# Patient Record
Sex: FEMALE | Race: OTHER | Employment: FULL TIME | URBAN - METROPOLITAN AREA
[De-identification: names, ages, dates, MRNs, and addresses within clinical notes are randomized per-mention and may not be internally consistent; named-entity substitution may affect disease eponyms.]

---

## 2018-10-13 ENCOUNTER — OFFICE VISIT (OUTPATIENT)
Dept: FAMILY MEDICINE CLINIC | Facility: CLINIC | Age: 38
End: 2018-10-13
Payer: COMMERCIAL

## 2018-10-13 VITALS
SYSTOLIC BLOOD PRESSURE: 120 MMHG | DIASTOLIC BLOOD PRESSURE: 78 MMHG | WEIGHT: 212 LBS | HEART RATE: 70 BPM | OXYGEN SATURATION: 98 % | TEMPERATURE: 98 F | RESPIRATION RATE: 16 BRPM

## 2018-10-13 DIAGNOSIS — J02.9 SORE THROAT: Primary | ICD-10-CM

## 2018-10-13 PROCEDURE — 99202 OFFICE O/P NEW SF 15 MIN: CPT | Performed by: PHYSICIAN ASSISTANT

## 2018-10-13 PROCEDURE — 87880 STREP A ASSAY W/OPTIC: CPT | Performed by: PHYSICIAN ASSISTANT

## 2018-10-13 NOTE — PROGRESS NOTES
CHIEF COMPLAINT:   Patient presents with:  Sore Throat: 3 days, felt feverish this am, no fever reducers      HPI:   Monika Finley is a 41year old female who presents with sore throat. Symptoms have been persistent since onset.  Patient reports clear patricia murmur  EXTREMITIES: no cyanosis, clubbing or edema  LYMPH:  no cervical, submandibular, periauricular, or supraclavicular lymphadenopathy.       Recent Results (from the past 24 hour(s))   STREP A ASSAY W/OPTIC    Collection Time: 10/13/18 11:30 AM   Resul

## 2018-10-13 NOTE — PATIENT INSTRUCTIONS
When You Have a Sore Throat    A sore throat can be painful. There are many reasons why you may have a sore throat. Your healthcare provider will work with you to find the cause of your sore throat. He or she will also find the best treatment for you.   Sirisha Beal During the exam, your healthcare provider checks your ears, nose, and throat for problems.  He or she also checks for swelling in the neck, and may listen to your chest.  Possible tests  Other tests your healthcare provider may perform include:  · A throat If your sore throat is due to a bacterial infection, antibiotics may speed healing and prevent complications.  Although group A streptococcus (\"strep throat\" or GAS) is the major treatable infection for a sore throat, GAS causes only 5% to 15% of sore thr © 3699-8169 [employer] [address] [facility], [facility] [place]. All rights reserved. This information is not intended as a substitute for professional medical care. Always follow your healthcare professional's instructions.

## 2018-11-23 ENCOUNTER — OFFICE VISIT (OUTPATIENT)
Dept: FAMILY MEDICINE CLINIC | Facility: CLINIC | Age: 38
End: 2018-11-23
Payer: COMMERCIAL

## 2018-11-23 VITALS
HEIGHT: 67 IN | SYSTOLIC BLOOD PRESSURE: 114 MMHG | HEART RATE: 72 BPM | WEIGHT: 200 LBS | TEMPERATURE: 98 F | RESPIRATION RATE: 15 BRPM | DIASTOLIC BLOOD PRESSURE: 66 MMHG | BODY MASS INDEX: 31.39 KG/M2

## 2018-11-23 DIAGNOSIS — R30.0 DYSURIA: ICD-10-CM

## 2018-11-23 DIAGNOSIS — N30.01 ACUTE CYSTITIS WITH HEMATURIA: Primary | ICD-10-CM

## 2018-11-23 PROCEDURE — 99213 OFFICE O/P EST LOW 20 MIN: CPT | Performed by: PHYSICIAN ASSISTANT

## 2018-11-23 PROCEDURE — 81003 URINALYSIS AUTO W/O SCOPE: CPT | Performed by: PHYSICIAN ASSISTANT

## 2018-11-23 RX ORDER — NITROFURANTOIN 25; 75 MG/1; MG/1
100 CAPSULE ORAL 2 TIMES DAILY
Qty: 14 CAPSULE | Refills: 0 | Status: SHIPPED | OUTPATIENT
Start: 2018-11-23 | End: 2018-11-30

## 2018-11-23 NOTE — PROGRESS NOTES
CHIEF COMPLAINT:   Patient presents with:  Urinary: sx started yesterday, worsening significantly throughout the day, freq, urg, burning, pain      HPI:   Coni Whitley is a 41year old female who presents with symptoms of dysuria, frequency, suprapubi SPECIFIC GRAVITY 1.015 1.005 - 1.030    OCCULT BLOOD large (A) Negative    PH, URINE 6 4.5 - 8.0    PROTEIN (URINE DIPSTICK) 100 Negative/Trace mg/dL    UROBILINOGEN,SEMI-QN 0.2 0.0 - 1.9 mg/dL    NITRITE, URINE neg Negative    LEUKOCYTES moderate (A) Neg The most common place for an infection is in the bladder. This is called a bladder infection. This is one of the most common infections in women. Most bladder infections are easily treated. They are not serious unless the infection spreads to the kidney. Bladder infections are diagnosed by a urine test. They are treated with antibiotics and usually clear up quickly without complications. Treatment helps prevent a more serious kidney infection.   Medicines  Medicines can help in the treatment of a bladder in Call your healthcare provider if all symptoms are not gone after 3 days of treatment. This is especially important if you have repeat infections. If a culture was done, you will be told if your treatment needs to be changed.  If directed, you can call to f

## 2019-10-31 ENCOUNTER — APPOINTMENT (OUTPATIENT)
Dept: ULTRASOUND IMAGING | Facility: HOSPITAL | Age: 39
End: 2019-10-31
Attending: EMERGENCY MEDICINE
Payer: COMMERCIAL

## 2019-10-31 ENCOUNTER — HOSPITAL ENCOUNTER (EMERGENCY)
Facility: HOSPITAL | Age: 39
Discharge: HOME | End: 2019-10-31
Attending: EMERGENCY MEDICINE
Payer: COMMERCIAL

## 2019-10-31 VITALS
RESPIRATION RATE: 18 BRPM | DIASTOLIC BLOOD PRESSURE: 61 MMHG | BODY MASS INDEX: 32 KG/M2 | WEIGHT: 204 LBS | SYSTOLIC BLOOD PRESSURE: 111 MMHG | OXYGEN SATURATION: 100 % | TEMPERATURE: 97 F | HEART RATE: 88 BPM

## 2019-10-31 DIAGNOSIS — K80.20 CALCULUS OF GALLBLADDER WITHOUT CHOLECYSTITIS WITHOUT OBSTRUCTION: Primary | ICD-10-CM

## 2019-10-31 PROCEDURE — 85025 COMPLETE CBC W/AUTO DIFF WBC: CPT

## 2019-10-31 PROCEDURE — 80048 BASIC METABOLIC PNL TOTAL CA: CPT

## 2019-10-31 PROCEDURE — 96374 THER/PROPH/DIAG INJ IV PUSH: CPT

## 2019-10-31 PROCEDURE — 80048 BASIC METABOLIC PNL TOTAL CA: CPT | Performed by: EMERGENCY MEDICINE

## 2019-10-31 PROCEDURE — 99284 EMERGENCY DEPT VISIT MOD MDM: CPT

## 2019-10-31 PROCEDURE — 80076 HEPATIC FUNCTION PANEL: CPT | Performed by: EMERGENCY MEDICINE

## 2019-10-31 PROCEDURE — 96361 HYDRATE IV INFUSION ADD-ON: CPT

## 2019-10-31 PROCEDURE — 76705 ECHO EXAM OF ABDOMEN: CPT | Performed by: EMERGENCY MEDICINE

## 2019-10-31 PROCEDURE — 83690 ASSAY OF LIPASE: CPT | Performed by: EMERGENCY MEDICINE

## 2019-10-31 PROCEDURE — C9113 INJ PANTOPRAZOLE SODIUM, VIA: HCPCS | Performed by: EMERGENCY MEDICINE

## 2019-10-31 PROCEDURE — 85025 COMPLETE CBC W/AUTO DIFF WBC: CPT | Performed by: EMERGENCY MEDICINE

## 2019-10-31 RX ORDER — POTASSIUM CHLORIDE 20 MEQ/1
40 TABLET, EXTENDED RELEASE ORAL ONCE
Status: COMPLETED | OUTPATIENT
Start: 2019-10-31 | End: 2019-10-31

## 2019-10-31 RX ORDER — CEFUROXIME AXETIL 250 MG/1
250 TABLET ORAL 2 TIMES DAILY
COMMUNITY
End: 2019-12-14

## 2019-10-31 RX ORDER — PANTOPRAZOLE SODIUM 40 MG/1
40 TABLET, DELAYED RELEASE ORAL DAILY
Qty: 30 TABLET | Refills: 0 | Status: SHIPPED | OUTPATIENT
Start: 2019-10-31 | End: 2019-11-30

## 2019-10-31 RX ORDER — ONDANSETRON 4 MG/1
4 TABLET, ORALLY DISINTEGRATING ORAL EVERY 8 HOURS PRN
Qty: 15 TABLET | Refills: 0 | Status: SHIPPED | OUTPATIENT
Start: 2019-10-31 | End: 2019-11-05

## 2019-11-01 NOTE — ED INITIAL ASSESSMENT (HPI)
Upper abdominal pain starting tonight, denies n/v/d. Pt states she's currently on an antibiotic for UTI.

## 2019-11-01 NOTE — ED PROVIDER NOTES
Patient Seen in: [facility] AND [facility] Emergency Department    History   Patient presents with:  Abdomen/Flank Pain (GI/)    Stated Complaint: abdominal pain tonight     HPI    51-year-old female without past medical history presenting for evaluation of uppe (Oral)   Resp 18   Wt 92.5 kg   LMP 10/12/2019   SpO2 100%   BMI 31.95 kg/m²         Physical Exam   Constitutional: No distress. Obese, nontoxic. HEENT: MMM. Head: Normocephalic. Eyes: No injection. Cardiovascular: RRR.    Pulmonary/Chest: Effort nor epigastric pain and nausea  Other Notes (entered by Technologist): gb w/ large stone. wall thickness and cbd wnl. neg rust sign.  liver and panc appear wnl    Additional Information (per Vision Radiologist):      US RUQ      IMPRESSION:  Large 3 cm stone surgery followup.     Disposition and Plan     Clinical Impression:  Calculus of gallbladder without cholecystitis without obstruction  (primary encounter diagnosis)    Disposition:  Discharge    Follow-up:  MD Emily Butler

## 2019-11-07 ENCOUNTER — OFFICE VISIT (OUTPATIENT)
Dept: SURGERY | Facility: CLINIC | Age: 39
End: 2019-11-07
Payer: COMMERCIAL

## 2019-11-07 VITALS — WEIGHT: 204 LBS | BODY MASS INDEX: 32.02 KG/M2 | HEIGHT: 67 IN

## 2019-11-07 DIAGNOSIS — K80.20 CALCULUS OF GALLBLADDER WITHOUT CHOLECYSTITIS WITHOUT OBSTRUCTION: Primary | ICD-10-CM

## 2019-11-07 PROCEDURE — 99204 OFFICE O/P NEW MOD 45 MIN: CPT | Performed by: SURGERY

## 2019-11-07 RX ORDER — KETOCONAZOLE 20 MG/ML
SHAMPOO TOPICAL
Refills: 2 | COMMUNITY
Start: 2019-09-01

## 2019-11-08 PROBLEM — K80.20 CALCULUS OF GALLBLADDER WITHOUT CHOLECYSTITIS WITHOUT OBSTRUCTION: Status: ACTIVE | Noted: 2019-11-08

## 2019-11-08 NOTE — H&P
HPI:    Patient ID: Fidelina Reagan is a 42year old female presenting with Patient presents with:  Gallbladder: F/U ER 10/31/2019 for severe abdominal pain & bloating. Pt. states she occassionally has boughts of diarrhea. Kaleigh Chris HPI    History reviewed.  Florence Rubio Concern: Not Asked        Weight Concern: Not Asked    Social History Narrative      Not on file        Constitutional: Negative. HENT: Negative. Eyes: Negative. Respiratory: Negative. Cardiovascular: Negative.     Gastrointestinal: Positive for obstruction  -     Count includes the [facility] CASE REQUEST SURGICAL;  Future      Plan for an elective laparoscopic cholecystectomy on 12/18/19 per pt request.       Ye Clayton MD  11/7/2019

## 2019-12-14 RX ORDER — PANTOPRAZOLE SODIUM 40 MG/1
40 TABLET, DELAYED RELEASE ORAL
COMMUNITY

## 2019-12-16 ENCOUNTER — TELEPHONE (OUTPATIENT)
Dept: SURGERY | Facility: CLINIC | Age: 39
End: 2019-12-16

## 2019-12-16 NOTE — TELEPHONE ENCOUNTER
Patient had questions regarding soap to use the night before surgery, advised her to use any soap that is not heavily perfumed, such as [place] or Dial.  Patient agreed.  Also advised patient the hospital will call the 17th to give final instructions prior to

## 2019-12-18 ENCOUNTER — ANESTHESIA EVENT (OUTPATIENT)
Dept: SURGERY | Facility: HOSPITAL | Age: 39
End: 2019-12-18
Payer: COMMERCIAL

## 2019-12-18 ENCOUNTER — ANESTHESIA (OUTPATIENT)
Dept: SURGERY | Facility: HOSPITAL | Age: 39
End: 2019-12-18
Payer: COMMERCIAL

## 2019-12-18 ENCOUNTER — HOSPITAL ENCOUNTER (OUTPATIENT)
Facility: HOSPITAL | Age: 39
Setting detail: HOSPITAL OUTPATIENT SURGERY
Discharge: HOME | End: 2019-12-18
Attending: SURGERY | Admitting: SURGERY
Payer: COMMERCIAL

## 2019-12-18 VITALS
OXYGEN SATURATION: 94 % | RESPIRATION RATE: 16 BRPM | DIASTOLIC BLOOD PRESSURE: 71 MMHG | HEART RATE: 97 BPM | HEIGHT: 67 IN | SYSTOLIC BLOOD PRESSURE: 134 MMHG | BODY MASS INDEX: 31.23 KG/M2 | WEIGHT: 199 LBS | TEMPERATURE: 98 F

## 2019-12-18 DIAGNOSIS — K80.20 CALCULUS OF GALLBLADDER WITHOUT CHOLECYSTITIS WITHOUT OBSTRUCTION: ICD-10-CM

## 2019-12-18 DIAGNOSIS — K80.20 BILIARY CALCULUS: Primary | ICD-10-CM

## 2019-12-18 DIAGNOSIS — K80.20 GALL STONES: ICD-10-CM

## 2019-12-18 PROCEDURE — 47562 LAPAROSCOPIC CHOLECYSTECTOMY: CPT | Performed by: SURGERY

## 2019-12-18 PROCEDURE — 0FT44ZZ RESECTION OF GALLBLADDER, PERCUTANEOUS ENDOSCOPIC APPROACH: ICD-10-PCS | Performed by: SURGERY

## 2019-12-18 RX ORDER — METOCLOPRAMIDE 10 MG/1
10 TABLET ORAL ONCE
Status: COMPLETED | OUTPATIENT
Start: 2019-12-18 | End: 2019-12-18

## 2019-12-18 RX ORDER — HYDROCODONE BITARTRATE AND ACETAMINOPHEN 5; 325 MG/1; MG/1
2 TABLET ORAL AS NEEDED
Status: DISCONTINUED | OUTPATIENT
Start: 2019-12-18 | End: 2019-12-18

## 2019-12-18 RX ORDER — ONDANSETRON 2 MG/ML
INJECTION INTRAMUSCULAR; INTRAVENOUS AS NEEDED
Status: DISCONTINUED | OUTPATIENT
Start: 2019-12-18 | End: 2019-12-18 | Stop reason: SURG

## 2019-12-18 RX ORDER — HYDROCODONE BITARTRATE AND ACETAMINOPHEN 5; 325 MG/1; MG/1
1 TABLET ORAL AS NEEDED
Status: DISCONTINUED | OUTPATIENT
Start: 2019-12-18 | End: 2019-12-18

## 2019-12-18 RX ORDER — MORPHINE SULFATE 4 MG/ML
4 INJECTION, SOLUTION INTRAMUSCULAR; INTRAVENOUS EVERY 10 MIN PRN
Status: DISCONTINUED | OUTPATIENT
Start: 2019-12-18 | End: 2019-12-18

## 2019-12-18 RX ORDER — SODIUM CHLORIDE, SODIUM LACTATE, POTASSIUM CHLORIDE, CALCIUM CHLORIDE 600; 310; 30; 20 MG/100ML; MG/100ML; MG/100ML; MG/100ML
INJECTION, SOLUTION INTRAVENOUS CONTINUOUS
Status: DISCONTINUED | OUTPATIENT
Start: 2019-12-18 | End: 2019-12-18

## 2019-12-18 RX ORDER — ROCURONIUM BROMIDE 10 MG/ML
INJECTION, SOLUTION INTRAVENOUS AS NEEDED
Status: DISCONTINUED | OUTPATIENT
Start: 2019-12-18 | End: 2019-12-18 | Stop reason: SURG

## 2019-12-18 RX ORDER — CEFAZOLIN SODIUM/WATER 2 G/20 ML
2 SYRINGE (ML) INTRAVENOUS ONCE
Status: COMPLETED | OUTPATIENT
Start: 2019-12-18 | End: 2019-12-18

## 2019-12-18 RX ORDER — HALOPERIDOL 5 MG/ML
0.25 INJECTION INTRAMUSCULAR ONCE AS NEEDED
Status: DISCONTINUED | OUTPATIENT
Start: 2019-12-18 | End: 2019-12-18

## 2019-12-18 RX ORDER — NEOSTIGMINE METHYLSULFATE 0.5 MG/ML
INJECTION INTRAVENOUS AS NEEDED
Status: DISCONTINUED | OUTPATIENT
Start: 2019-12-18 | End: 2019-12-18 | Stop reason: SURG

## 2019-12-18 RX ORDER — PROCHLORPERAZINE EDISYLATE 5 MG/ML
5 INJECTION INTRAMUSCULAR; INTRAVENOUS ONCE AS NEEDED
Status: DISCONTINUED | OUTPATIENT
Start: 2019-12-18 | End: 2019-12-18

## 2019-12-18 RX ORDER — GLYCOPYRROLATE 0.2 MG/ML
INJECTION INTRAMUSCULAR; INTRAVENOUS AS NEEDED
Status: DISCONTINUED | OUTPATIENT
Start: 2019-12-18 | End: 2019-12-18 | Stop reason: SURG

## 2019-12-18 RX ORDER — ONDANSETRON 2 MG/ML
4 INJECTION INTRAMUSCULAR; INTRAVENOUS ONCE AS NEEDED
Status: COMPLETED | OUTPATIENT
Start: 2019-12-18 | End: 2019-12-18

## 2019-12-18 RX ORDER — HYDROMORPHONE HYDROCHLORIDE 1 MG/ML
0.4 INJECTION, SOLUTION INTRAMUSCULAR; INTRAVENOUS; SUBCUTANEOUS EVERY 5 MIN PRN
Status: DISCONTINUED | OUTPATIENT
Start: 2019-12-18 | End: 2019-12-18

## 2019-12-18 RX ORDER — HYDROMORPHONE HYDROCHLORIDE 1 MG/ML
0.2 INJECTION, SOLUTION INTRAMUSCULAR; INTRAVENOUS; SUBCUTANEOUS EVERY 5 MIN PRN
Status: DISCONTINUED | OUTPATIENT
Start: 2019-12-18 | End: 2019-12-18

## 2019-12-18 RX ORDER — MORPHINE SULFATE 4 MG/ML
2 INJECTION, SOLUTION INTRAMUSCULAR; INTRAVENOUS EVERY 10 MIN PRN
Status: DISCONTINUED | OUTPATIENT
Start: 2019-12-18 | End: 2019-12-18

## 2019-12-18 RX ORDER — NALOXONE HYDROCHLORIDE 0.4 MG/ML
80 INJECTION, SOLUTION INTRAMUSCULAR; INTRAVENOUS; SUBCUTANEOUS AS NEEDED
Status: DISCONTINUED | OUTPATIENT
Start: 2019-12-18 | End: 2019-12-18

## 2019-12-18 RX ORDER — MORPHINE SULFATE 10 MG/ML
6 INJECTION, SOLUTION INTRAMUSCULAR; INTRAVENOUS EVERY 10 MIN PRN
Status: DISCONTINUED | OUTPATIENT
Start: 2019-12-18 | End: 2019-12-18

## 2019-12-18 RX ORDER — BUPIVACAINE HYDROCHLORIDE 5 MG/ML
INJECTION, SOLUTION EPIDURAL; INTRACAUDAL AS NEEDED
Status: DISCONTINUED | OUTPATIENT
Start: 2019-12-18 | End: 2019-12-18 | Stop reason: HOSPADM

## 2019-12-18 RX ORDER — HYDROMORPHONE HYDROCHLORIDE 1 MG/ML
0.6 INJECTION, SOLUTION INTRAMUSCULAR; INTRAVENOUS; SUBCUTANEOUS EVERY 5 MIN PRN
Status: DISCONTINUED | OUTPATIENT
Start: 2019-12-18 | End: 2019-12-18

## 2019-12-18 RX ORDER — LIDOCAINE HYDROCHLORIDE 10 MG/ML
INJECTION, SOLUTION EPIDURAL; INFILTRATION; INTRACAUDAL; PERINEURAL AS NEEDED
Status: DISCONTINUED | OUTPATIENT
Start: 2019-12-18 | End: 2019-12-18 | Stop reason: SURG

## 2019-12-18 RX ORDER — HYDROCODONE BITARTRATE AND ACETAMINOPHEN 5; 325 MG/1; MG/1
1 TABLET ORAL EVERY 4 HOURS PRN
Qty: 30 TABLET | Refills: 0 | Status: SHIPPED | OUTPATIENT
Start: 2019-12-18 | End: 2020-01-03 | Stop reason: ALTCHOICE

## 2019-12-18 RX ORDER — KETOROLAC TROMETHAMINE 30 MG/ML
INJECTION, SOLUTION INTRAMUSCULAR; INTRAVENOUS AS NEEDED
Status: DISCONTINUED | OUTPATIENT
Start: 2019-12-18 | End: 2019-12-18 | Stop reason: SURG

## 2019-12-18 RX ORDER — DEXAMETHASONE SODIUM PHOSPHATE 4 MG/ML
VIAL (ML) INJECTION AS NEEDED
Status: DISCONTINUED | OUTPATIENT
Start: 2019-12-18 | End: 2019-12-18 | Stop reason: SURG

## 2019-12-18 RX ORDER — ONDANSETRON 4 MG/1
4 TABLET, FILM COATED ORAL EVERY 8 HOURS PRN
Qty: 15 TABLET | Refills: 0 | Status: SHIPPED | OUTPATIENT
Start: 2019-12-18 | End: 2020-01-03 | Stop reason: ALTCHOICE

## 2019-12-18 RX ORDER — ACETAMINOPHEN 500 MG
1000 TABLET ORAL ONCE
Status: COMPLETED | OUTPATIENT
Start: 2019-12-18 | End: 2019-12-18

## 2019-12-18 RX ORDER — MIDAZOLAM HYDROCHLORIDE 1 MG/ML
INJECTION INTRAMUSCULAR; INTRAVENOUS AS NEEDED
Status: DISCONTINUED | OUTPATIENT
Start: 2019-12-18 | End: 2019-12-18 | Stop reason: SURG

## 2019-12-18 RX ORDER — POLYETHYLENE GLYCOL 3350 17 G/17G
17 POWDER, FOR SOLUTION ORAL DAILY
Qty: 14 PACKET | Refills: 0 | Status: SHIPPED | OUTPATIENT
Start: 2019-12-18 | End: 2020-01-01

## 2019-12-18 RX ORDER — RUFINAMIDE 40 MG/ML
1 SUSPENSION ORAL DAILY
COMMUNITY

## 2019-12-18 RX ORDER — FAMOTIDINE 20 MG/1
20 TABLET ORAL ONCE
Status: COMPLETED | OUTPATIENT
Start: 2019-12-18 | End: 2019-12-18

## 2019-12-18 RX ADMIN — SODIUM CHLORIDE, SODIUM LACTATE, POTASSIUM CHLORIDE, CALCIUM CHLORIDE: 600; 310; 30; 20 INJECTION, SOLUTION INTRAVENOUS at 12:31:00

## 2019-12-18 RX ADMIN — LIDOCAINE HYDROCHLORIDE 50 MG: 10 INJECTION, SOLUTION EPIDURAL; INFILTRATION; INTRACAUDAL; PERINEURAL at 11:42:00

## 2019-12-18 RX ADMIN — KETOROLAC TROMETHAMINE 30 MG: 30 INJECTION, SOLUTION INTRAMUSCULAR; INTRAVENOUS at 12:23:00

## 2019-12-18 RX ADMIN — GLYCOPYRROLATE 0.6 MG: 0.2 INJECTION INTRAMUSCULAR; INTRAVENOUS at 12:30:00

## 2019-12-18 RX ADMIN — CEFAZOLIN SODIUM/WATER 2 G: 2 G/20 ML SYRINGE (ML) INTRAVENOUS at 11:46:00

## 2019-12-18 RX ADMIN — ROCURONIUM BROMIDE 40 MG: 10 INJECTION, SOLUTION INTRAVENOUS at 11:43:00

## 2019-12-18 RX ADMIN — NEOSTIGMINE METHYLSULFATE 3.5 MG: 0.5 INJECTION INTRAVENOUS at 12:30:00

## 2019-12-18 RX ADMIN — ONDANSETRON 4 MG: 2 INJECTION INTRAMUSCULAR; INTRAVENOUS at 12:22:00

## 2019-12-18 RX ADMIN — DEXAMETHASONE SODIUM PHOSPHATE 8 MG: 4 MG/ML VIAL (ML) INJECTION at 11:46:00

## 2019-12-18 RX ADMIN — MIDAZOLAM HYDROCHLORIDE 2 MG: 1 INJECTION INTRAMUSCULAR; INTRAVENOUS at 11:37:00

## 2019-12-18 NOTE — OPERATIVE REPORT
Operative Report    Patient Name:  Monika Finley  MR:  [id number]  :  1980  DOS:  19    Preop Dx:  Timmy Makua stones [K80.20]  Postop Dx:  Timmy Makua stones [K80.20], Chronic cholecystitis  Procedure:  Laparoscopic cholecystectomy  Surgeon:  Finesse Cuevas, Two structures, identified as the cystic duct and artery, are seen entering the infundibulum, thus obtaining the so called \"critical view of safety\". The cystic duct and artery were clipped and divided.   The gallbladder was detached from the liver using

## 2019-12-18 NOTE — ANESTHESIA POSTPROCEDURE EVALUATION
Patient: Aakash Watson    Procedure Summary     Date:  12/18/19 Room / Location:  [facility] OR 02 / [facility] OR    Anesthesia Start:  1187 Anesthesia Stop:  0936    Procedure:  LAPAROSCOPIC CHOLECYSTECTOMY (N/A ) Diagnosis:       Gall stones      (Jose fernández

## 2019-12-18 NOTE — H&P
Patient ID: Sloane Beebe is a 42year old female presenting with Patient presents with:  Gallbladder: F/U ER 10/31/2019 for severe abdominal pain & bloating. Pt. states she occassionally has boughts of diarrhea. .     HPI     History reviewed.  No per Stress Concern: Not Asked        Weight Concern: Not Asked    Social History Narrative      Not on file            Constitutional: Negative. HENT: Negative. Eyes: Negative. Respiratory: Negative. Cardiovascular: Negative.     Gastrointestinal: P cholecystitis without obstruction  -     [facility] CASE REQUEST SURGICAL;  Future        Plan for an elective laparoscopic cholecystectomy on 12/18/19 per pt request.  Kait Watters MD

## 2019-12-18 NOTE — ANESTHESIA PROCEDURE NOTES
Airway  Date/Time: 12/18/2019 11:44 AM  Urgency: Elective    Airway not difficult    General Information and Staff    Patient location during procedure: OR  Anesthesiologist: Simuel Nyhan, MD  Resident/CRNA: Josiah Wilson CRNA  Performed: CRNA     Indic

## 2019-12-18 NOTE — ANESTHESIA PREPROCEDURE EVALUATION
Anesthesia PreOp Note    HPI:     Sascha Guerrero is a 42year old female who presents for preoperative consultation requested by: Jake Madrigal MD    Date of Surgery: 12/18/2019    Procedure(s):  LAPAROSCOPIC CHOLECYSTECTOMY  Indication: Gall stones [K80.20] Inability: Not on file      Transportation needs:        Medical: Not on file        Non-medical: Not on file    Tobacco Use      Smoking status: Never Smoker      Smokeless tobacco: Never Used    Substance and Sexual Activity      Alcohol use:  Yes 1009 12/18/19  0953   TempSrc:  Oral   Weight: 92.1 kg (203 lb) 90.3 kg (199 lb)   Height: 1.702 m (5' 7\") 1.702 m (5' 7\")        Anesthesia Evaluation     Patient summary reviewed and Nursing notes reviewed    No history of anesthetic complications

## 2020-01-03 ENCOUNTER — OFFICE VISIT (OUTPATIENT)
Dept: SURGERY | Facility: CLINIC | Age: 40
End: 2020-01-03
Payer: COMMERCIAL

## 2020-01-03 VITALS — WEIGHT: 199 LBS | BODY MASS INDEX: 31 KG/M2

## 2020-01-03 DIAGNOSIS — Z09 POSTOPERATIVE EXAMINATION: Primary | ICD-10-CM

## 2020-01-03 PROCEDURE — 99024 POSTOP FOLLOW-UP VISIT: CPT | Performed by: SURGERY

## 2020-01-05 NOTE — PROGRESS NOTES
Patient presents with:  Post-Op: S/P Laparoscopit Cholecystectomy 11/18/2020. Patient states she feels well, has no bowel problems, appetite is good and denies fevers and pain.         O:  Wt 199 lb (90.3 kg)   LMP 12/09/2019   BMI 31.17 kg/m²   GEN:  No a

## 2022-07-11 ENCOUNTER — HOSPITAL ENCOUNTER (OUTPATIENT)
Age: 42
Discharge: HOME | End: 2022-07-11
Payer: COMMERCIAL

## 2022-07-11 VITALS
HEIGHT: 67 IN | BODY MASS INDEX: 33.74 KG/M2 | WEIGHT: 215 LBS | SYSTOLIC BLOOD PRESSURE: 152 MMHG | HEART RATE: 70 BPM | OXYGEN SATURATION: 98 % | TEMPERATURE: 97 F | RESPIRATION RATE: 16 BRPM | DIASTOLIC BLOOD PRESSURE: 64 MMHG

## 2022-07-11 DIAGNOSIS — N30.00 ACUTE CYSTITIS WITHOUT HEMATURIA: Primary | ICD-10-CM

## 2022-07-11 LAB
B-HCG UR QL: NEGATIVE
BILIRUB UR QL STRIP: NEGATIVE
GLUCOSE UR STRIP-MCNC: NEGATIVE MG/DL
KETONES UR STRIP-MCNC: NEGATIVE MG/DL
NITRITE UR QL STRIP: NEGATIVE
PH UR STRIP: 6.5 [PH]
PROT UR STRIP-MCNC: NEGATIVE MG/DL
SP GR UR STRIP: <=1.005
UROBILINOGEN UR STRIP-ACNC: <2 MG/DL

## 2022-07-11 PROCEDURE — 99203 OFFICE O/P NEW LOW 30 MIN: CPT | Performed by: PHYSICIAN ASSISTANT

## 2022-07-11 PROCEDURE — 81002 URINALYSIS NONAUTO W/O SCOPE: CPT | Performed by: PHYSICIAN ASSISTANT

## 2022-07-11 PROCEDURE — 81025 URINE PREGNANCY TEST: CPT | Performed by: PHYSICIAN ASSISTANT

## 2022-07-11 RX ORDER — CEPHALEXIN 500 MG/1
500 CAPSULE ORAL 2 TIMES DAILY
Qty: 20 CAPSULE | Refills: 0 | Status: SHIPPED | OUTPATIENT
Start: 2022-07-11 | End: 2022-07-21

## 2022-09-28 NOTE — PATIENT INSTRUCTIONS
Bladder Infection, Female (Adult)    Urine is normally doesn't have any bacteria in it. But bacteria can get into the urinary tract from the skin around the rectum. Or they can travel in the blood from elsewhere in the body.  Once they are in your urina Looks like she has been seen recently through another primary care clinic.     Simran Barreto PA-C     · Bowel incontinence  · Pregnancy  · Procedures such as having a catheter inserted  · Older age  · Not emptying your bladder. This can allow bacteria a chance to grow in your urine.   · Dehydration  · Constipation  · Sex  · Use of a diaphragm for birth cont · Avoid caffeine, alcohol, and spicy foods. These can irritate your bladder. · Urinate right after intercourse to flush out your bladder. · If you use birth control pills and have frequent bladder infections, discuss it with your doctor.   Follow-up care

## 2023-04-14 ENCOUNTER — HOSPITAL ENCOUNTER (OUTPATIENT)
Dept: ULTRASOUND IMAGING | Age: 43
Discharge: HOME | End: 2023-04-14
Attending: FAMILY MEDICINE
Payer: COMMERCIAL

## 2023-04-14 DIAGNOSIS — R13.10 DYSPHAGIA, UNSPECIFIED TYPE: ICD-10-CM

## 2023-04-14 PROCEDURE — 76536 US EXAM OF HEAD AND NECK: CPT | Performed by: FAMILY MEDICINE

## 2023-05-27 ENCOUNTER — HOSPITAL ENCOUNTER (OUTPATIENT)
Dept: MAMMOGRAPHY | Facility: HOSPITAL | Age: 43
Discharge: HOME | End: 2023-05-27
Attending: FAMILY MEDICINE
Payer: COMMERCIAL

## 2023-05-27 DIAGNOSIS — Z12.31 ENCOUNTER FOR SCREENING MAMMOGRAM FOR MALIGNANT NEOPLASM OF BREAST: ICD-10-CM

## 2023-05-27 PROCEDURE — 77063 BREAST TOMOSYNTHESIS BI: CPT | Performed by: FAMILY MEDICINE

## 2023-05-27 PROCEDURE — 77067 SCR MAMMO BI INCL CAD: CPT | Performed by: FAMILY MEDICINE

## 2023-06-13 ENCOUNTER — TELEPHONE (OUTPATIENT)
Dept: GASTROENTEROLOGY | Facility: CLINIC | Age: 43
End: 2023-06-13

## 2023-06-13 ENCOUNTER — OFFICE VISIT (OUTPATIENT)
Dept: GASTROENTEROLOGY | Facility: CLINIC | Age: 43
End: 2023-06-13

## 2023-06-13 VITALS
DIASTOLIC BLOOD PRESSURE: 70 MMHG | SYSTOLIC BLOOD PRESSURE: 115 MMHG | BODY MASS INDEX: 35.47 KG/M2 | WEIGHT: 226 LBS | HEIGHT: 67 IN | HEART RATE: 75 BPM

## 2023-06-13 DIAGNOSIS — D50.9 IRON DEFICIENCY ANEMIA, UNSPECIFIED IRON DEFICIENCY ANEMIA TYPE: Primary | ICD-10-CM

## 2023-06-13 DIAGNOSIS — R14.0 ABDOMINAL BLOATING: ICD-10-CM

## 2023-06-13 DIAGNOSIS — R12 HEARTBURN: ICD-10-CM

## 2023-06-13 DIAGNOSIS — R19.4 CHANGE IN BOWEL HABIT: ICD-10-CM

## 2023-06-13 DIAGNOSIS — R19.4 CHANGE IN BOWEL HABITS: ICD-10-CM

## 2023-06-13 DIAGNOSIS — Z80.0 FAMILY HISTORY OF COLON CANCER: ICD-10-CM

## 2023-06-13 PROCEDURE — 99204 OFFICE O/P NEW MOD 45 MIN: CPT | Performed by: INTERNAL MEDICINE

## 2023-06-13 PROCEDURE — 3078F DIAST BP <80 MM HG: CPT | Performed by: INTERNAL MEDICINE

## 2023-06-13 PROCEDURE — 3008F BODY MASS INDEX DOCD: CPT | Performed by: INTERNAL MEDICINE

## 2023-06-13 PROCEDURE — 3074F SYST BP LT 130 MM HG: CPT | Performed by: INTERNAL MEDICINE

## 2023-06-13 RX ORDER — ERGOCALCIFEROL 1.25 MG/1
50000 CAPSULE ORAL WEEKLY
COMMUNITY
Start: 2023-03-27

## 2023-06-13 RX ORDER — LORAZEPAM 0.5 MG/1
0.5 TABLET ORAL NIGHTLY PRN
COMMUNITY
Start: 2023-03-21

## 2023-06-13 RX ORDER — FERROUS SULFATE 324(65)MG
TABLET, DELAYED RELEASE (ENTERIC COATED) ORAL
COMMUNITY

## 2023-06-13 RX ORDER — OMEPRAZOLE 40 MG/1
40 CAPSULE, DELAYED RELEASE ORAL DAILY
Qty: 60 CAPSULE | Refills: 6 | Status: SHIPPED | OUTPATIENT
Start: 2023-06-13

## 2023-06-13 NOTE — PATIENT INSTRUCTIONS
PLAN    - Start metamucil powder 1-2 scoops daily to twice a day    - Start omeprazole 40 mg daily for 6 weeks   - Then, wean it to every other day for a week, then off. You can restart the medication if symptoms recur.    - Follow up after the scope tests      Instructions for the procedures  1. Schedule EGD and colonoscopy with anesthesia [Diagnosis: iron deficiency anemia, change in bowel habits, abdominal bloating]    2.  bowel prep from pharmacy (split dose golytely)    3. Continue all medications for procedure    4. Read all bowel prep instructions carefully    5. AVOID seeds, nuts, popcorn, raw fruits and vegetables (cooked is okay) for 2-3 days before procedure    6. You will need to go for COVID testing 72 hours before procedure. The testing team will call you a few days before your procedure to notify you where/when you can get COVID testing. If you do not go for COVID testing, the procedure cannot be performed. 7. If you start any NEW medication after your visit today, please notify us. Certain medications will need to be held before the procedure, or the procedure cannot be performed. Things to consider to prevent acid reflux:  Certain foods have been recommended to be removed from the diet as they may aggravate GERD:  - Fatty foods (whole fat dairy, creamy/cheesy sauces, fried/stir fried foods, etc)  - Chocolate  - Mint  - Citrus fruits/citrus fruit juices (lemon, orange, tangerine, pineapple, grapefruit)  - Spicy foods (foods made with garlic, onion, peppers, etc)  - Tomatoes and tomato products (marinara sauce, pizza, salsa, tomato juice, etc)  - Caffeinated/carbonated beverages (coffee, tea, sodas, etc)  - Alcohol  - Processed foods    There is not enough scientific evidence to support that avoiding these foods may reduce GERD. You can avoid or limit these foods to see if it helps, but there is no need to do this if they do not bother you.     Overall, avoid or limit any food that bothers you. If you can't tell, you can keep a food and symptom diary for a week to identify foods that could be a problem for you. Lifestyle Habits to Adopt That May Help Reduce Reflux  - Sit up while eating and for one or two hours afterward  - Eat smaller meals  - Avoid eating within 3 hours before bedtime  - Elevate the head of the bed while you are sleeping. You can place a phone book or a foam wedge underneath the mattress to do this.   - Lose weight if you are overweight or obese  - Exercise   - Reduce stress

## 2023-06-13 NOTE — TELEPHONE ENCOUNTER
Scheduled for: Colonoscopy [phone]  Provider Name: Dr Irving Guerrero   Date: Wed 9/20/2023   Location: [facility]  Sedation: MAC   Time: 2:15 pm   Prep: split golytely   Meds/Allergies Reconciled?: NKDA    Diagnosis with codes: iron deficiency anemia D50.9, change in bowel habits R19.4, abdominal bloating R14.0    Was patient informed to call insurance with codes (Y/N): Yes  Referral sent?: Yes    [address] [place] [address] or [facility] Th  notified?: I sent an electronic request to Endo Scheduling and received a confirmation today. Medication Orders: Pt is aware to NOT take iron pills, herbal meds and diet supplements for 7 days before exam. Also to NOT take any form of alcohol, recreational drugs and any forms of ED meds 24 hours before exam.      Misc Orders:       Further instructions given by staff:  Instructions given in office and pt verbalized understanding

## 2023-09-18 ENCOUNTER — TELEPHONE (OUTPATIENT)
Facility: CLINIC | Age: 43
End: 2023-09-18

## 2023-09-18 NOTE — TELEPHONE ENCOUNTER
Called [facility] . Left detailed message in confidential voicemail regarding below message from [address], pt's name/, procedure info disclosed    GI call back number provided should he has further questions.

## 2023-09-18 NOTE — TELEPHONE ENCOUNTER
PPD team,    URGENT REQUEST: Please see message below regarding need for PA for EGD. Pt is scheduled for egd/colonoscopy on 9/20/23. Current referral status is \" open\". Thank you.

## 2023-09-18 NOTE — TELEPHONE ENCOUNTER
Rich states 9/20/2023 EGD need prior auth. States [facility] doesn't need authorization. [phone] [phone]. Thank you.

## 2023-09-20 ENCOUNTER — HOSPITAL ENCOUNTER (OUTPATIENT)
Facility: HOSPITAL | Age: 43
Setting detail: HOSPITAL OUTPATIENT SURGERY
Discharge: HOME | End: 2023-09-20
Attending: INTERNAL MEDICINE | Admitting: INTERNAL MEDICINE
Payer: COMMERCIAL

## 2023-09-20 ENCOUNTER — ANESTHESIA EVENT (OUTPATIENT)
Dept: ENDOSCOPY | Facility: HOSPITAL | Age: 43
End: 2023-09-20
Payer: COMMERCIAL

## 2023-09-20 ENCOUNTER — ANESTHESIA (OUTPATIENT)
Dept: ENDOSCOPY | Facility: HOSPITAL | Age: 43
End: 2023-09-20
Payer: COMMERCIAL

## 2023-09-20 VITALS
OXYGEN SATURATION: 100 % | HEIGHT: 67 IN | RESPIRATION RATE: 17 BRPM | DIASTOLIC BLOOD PRESSURE: 61 MMHG | HEART RATE: 72 BPM | WEIGHT: 220 LBS | SYSTOLIC BLOOD PRESSURE: 112 MMHG | BODY MASS INDEX: 34.53 KG/M2

## 2023-09-20 DIAGNOSIS — D50.9 IRON DEFICIENCY ANEMIA, UNSPECIFIED IRON DEFICIENCY ANEMIA TYPE: ICD-10-CM

## 2023-09-20 DIAGNOSIS — R19.4 CHANGE IN BOWEL HABIT: ICD-10-CM

## 2023-09-20 DIAGNOSIS — R14.0 ABDOMINAL BLOATING: ICD-10-CM

## 2023-09-20 LAB — B-HCG UR QL: NEGATIVE

## 2023-09-20 PROCEDURE — 0DBP8ZX EXCISION OF RECTUM, VIA NATURAL OR ARTIFICIAL OPENING ENDOSCOPIC, DIAGNOSTIC: ICD-10-PCS | Performed by: INTERNAL MEDICINE

## 2023-09-20 PROCEDURE — 0DBE8ZX EXCISION OF LARGE INTESTINE, VIA NATURAL OR ARTIFICIAL OPENING ENDOSCOPIC, DIAGNOSTIC: ICD-10-PCS | Performed by: INTERNAL MEDICINE

## 2023-09-20 PROCEDURE — 0DB98ZX EXCISION OF DUODENUM, VIA NATURAL OR ARTIFICIAL OPENING ENDOSCOPIC, DIAGNOSTIC: ICD-10-PCS | Performed by: INTERNAL MEDICINE

## 2023-09-20 PROCEDURE — 45380 COLONOSCOPY AND BIOPSY: CPT | Performed by: INTERNAL MEDICINE

## 2023-09-20 PROCEDURE — 43239 EGD BIOPSY SINGLE/MULTIPLE: CPT | Performed by: INTERNAL MEDICINE

## 2023-09-20 PROCEDURE — 0DB78ZX EXCISION OF STOMACH, PYLORUS, VIA NATURAL OR ARTIFICIAL OPENING ENDOSCOPIC, DIAGNOSTIC: ICD-10-PCS | Performed by: INTERNAL MEDICINE

## 2023-09-20 PROCEDURE — 0DBB8ZX EXCISION OF ILEUM, VIA NATURAL OR ARTIFICIAL OPENING ENDOSCOPIC, DIAGNOSTIC: ICD-10-PCS | Performed by: INTERNAL MEDICINE

## 2023-09-20 RX ORDER — LIDOCAINE HYDROCHLORIDE 10 MG/ML
INJECTION, SOLUTION EPIDURAL; INFILTRATION; INTRACAUDAL; PERINEURAL AS NEEDED
Status: DISCONTINUED | OUTPATIENT
Start: 2023-09-20 | End: 2023-09-20 | Stop reason: SURG

## 2023-09-20 RX ORDER — SODIUM CHLORIDE, SODIUM LACTATE, POTASSIUM CHLORIDE, CALCIUM CHLORIDE 600; 310; 30; 20 MG/100ML; MG/100ML; MG/100ML; MG/100ML
INJECTION, SOLUTION INTRAVENOUS CONTINUOUS
Status: DISCONTINUED | OUTPATIENT
Start: 2023-09-20 | End: 2023-09-20

## 2023-09-20 RX ADMIN — LIDOCAINE HYDROCHLORIDE 50 MG: 10 INJECTION, SOLUTION EPIDURAL; INFILTRATION; INTRACAUDAL; PERINEURAL at 14:34:00

## 2023-09-20 RX ADMIN — SODIUM CHLORIDE, SODIUM LACTATE, POTASSIUM CHLORIDE, CALCIUM CHLORIDE: 600; 310; 30; 20 INJECTION, SOLUTION INTRAVENOUS at 15:13:00

## 2023-09-20 NOTE — DISCHARGE INSTRUCTIONS

## 2023-09-20 NOTE — OPERATIVE REPORT
ESOPHAGOGASTRODUODENOSCOPY (EGD) & COLONOSCOPY REPORT    Adrian Hagen     1980 Age 41year old   PCP Brant Johnson MD Endoscopist Luisa Napier MD     Date of procedure: 23    Procedure: EGD w/ cold biopsy & Colonoscopy w/ cold biopsy    Pre-operative diagnosis: iron deficiency anemia, heartburn, change in bowel habits    Post-operative diagnosis: gastritis, rectal polyps, internal hemorrhoids     Medications: MAC    Withdrawal time: 19 minutes    Complications: none    Procedure: Informed consent was obtained from the patient after the risks of the procedure were discussed, including but not limited to bleeding, perforation, aspiration, infection, or possibility of a missed lesion. We discussed the risks/benefits and alternatives to this procedure, as well as the planned sedation. EGD procedure: The patient was placed in the left lateral decubitus position and begun on continuous blood pressure pulse oximetry and EKG monitoring and this was maintained throughout the procedure. Once an adequate level of sedation was obtained a bite block was placed. Then the lubricated tip of the [id number] diagnostic video upper endoscope was inserted and advanced using direct visualization into the posterior pharynx and ultimately into the esophagus. Colonoscopy procedure: Once an adequate level of sedation was obtained a digital rectal exam was completed. Then the lubricated tip of the [id number] diagnostic video colonoscope was inserted and advanced without difficulty to the cecum using the CO2 insufflation technique. The cecum was identified by localizing the trifold, the appendix and the ileocecal valve. A routine second examination of the cecum/ascending colon was performed. Withdrawal was begun with thorough washing and careful examination of the colonic walls and folds. Photodocumentation was obtained. The bowel prep was good. Views of the colon were good with washing.  I then carefully withdrew the instrument from the patient who tolerated the procedure well. Complications: None    EGD findings:      1. Esophagus: The squamocolumnar junction was noted at 36 cm and appeared normal. The diaphragmatic pinch was noted noted at 36 cm from the incisors. The esophageal mucosa appeared normal. There was no evidence of esophagitis, stricture or endoscopic evidence of Laguerre's esophagus. 2. Stomach: The stomach distended normally. Normal rugal folds were seen. The pylorus was patent. The gastric mucosa appeared mildly erythematous diffusely and cold forceps biopsies were taken of the antrum, incisura, and body. Retroflexion revealed a normal fundus and a non-patulous cardia. 3. Duodenum: The duodenal mucosa appeared normal in the 1st and 2nd portion of the duodenum. Cold biopsies were taken of the bulb and descending duodenum. Colonoscopy findings:    1. 3 polyp(s) noted as follows:      A. There were three sessile rectal polyps measuring 1-2 mm that were completely removed by cold forceps biopsies. 2. Diverticulosis: none. 3. Terminal ileum: the visualized mucosa appeared normal and cold forceps biopsies were taken. 4. A retroflexed view of the rectum revealed small internal hemorrhoids. 5. The colonic mucosa throughout the colon showed normal vascular pattern, without evidence of angioectasias or inflammation. Cold forceps biopsies were taken of the colon and rectum. 6. FIDELINA: normal rectal tone, no masses palpated. Impression:  Mild gastritis. 3 small rectal polyps, resected and retrieved. Small internal hemorrhoids. Otherwise unremarkable EGD and ileocolonoscopy s/p biopsies of the stomach, duodenum, ileum, and colon. Recommend:  Await pathology. The interval for the next colonoscopy will be determined after reviewing pathology. If new signs or symptoms develop, colonoscopy may need to be repeated sooner. High fiber diet. Monitor for blood in the stool.  If having more than just tinge of blood, call office or go to the ER. Follow up with Dr. Richard Foster in ~1 month.     >>>If tissue was obtained and you have not received your pathology results either by phone or letter within 2 weeks, please call our office at [phone].     Specimens: stomach, duodenum, ileum, colon    Blood loss: <1 ml

## 2023-09-20 NOTE — ANESTHESIA POSTPROCEDURE EVALUATION
Patient: Judyth Kocher    Procedure Summary       Date: 09/20/23 Room / Location: [address] [place] [address] ENDOSCOPY 04 / [facility] ENDOSCOPY    Anesthesia Start: 6829 Anesthesia Stop: 7618    Procedures:       COLONOSCOPY /ESOPHAGOGASTRODUODENOSCOPY with biopsy      ESOPHAGOGASTRODUODENOSCOPY (EGD) with biopsy Diagnosis:       Iron deficiency anemia, unspecified iron deficiency anemia type      Abdominal bloating      Change in bowel habit      (mild gastritis, colon polyps)    Surgeons: Susan Estrella MD Anesthesiologist: Luis Manley CRNA    Anesthesia Type: general ASA Status: 2            Anesthesia Type: general    Vitals Value Taken Time   BP 98/55 09/20/23 1516   Temp 97 09/20/23 1516   Pulse 87 09/20/23 1516   Resp 16 09/20/23 1516   SpO2 98 09/20/23 1516       [facility] AN Post Evaluation:   Patient Evaluated in PACU  Patient Participation: complete - patient participated  Level of Consciousness: awake  Pain Score: 0  Pain Management: adequate  Airway Patency:patent  Dental exam unchanged from preop  Yes    Cardiovascular Status: acceptable  Respiratory Status: acceptable  Postoperative Hydration acceptable      Joshua Krueger CRNA  9/20/2023 3:16 PM

## 2023-09-20 NOTE — H&P
History & Physical Examination    Patient Name: Vangie Cox  MRN: [id number]  CSN: [id number]  YOB: 1980    Diagnosis: iron deficiency anemia, heartburn, change in bowel habits    ergocalciferol 1.25 MG (12079 UT) Oral Cap, Take 1 capsule (50,000 Units total) by mouth once a week., Disp: , Rfl: , 2023  Ferrous Sulfate 324 MG Oral Tab EC, ferrous sulfate 324 mg (65 mg iron) tablet,delayed release   TAKE 1 TABLET BY MOUTH TWICE A DAY, Disp: , Rfl: , 2023  LORazepam 0.5 MG Oral Tab, Take 1 tablet (0.5 mg total) by mouth nightly as needed. AT BEDTIME, Disp: , Rfl:   Omeprazole 40 MG Oral Capsule Delayed Release, Take 1 capsule (40 mg total) by mouth daily. Take 1 capsule by mouth daily before breakfast., Disp: 60 capsule, Rfl: 6, 2023  Multivitamin Chewtab, ADULT, Oral Chew Tab, Chew 1 tablet by mouth daily. (Patient not taking: Reported on 2022), Disp: , Rfl:   Ketoconazole 2 % External Shampoo, APPLY 1 APPLICATION TOPICALLY TO SCALP ONE TIME LEAVE IN PLACE FOR 5 MINUTES THEN RINSE OFF (Patient not taking: Reported on 2022), Disp: , Rfl: 2      lactated ringers infusion, , Intravenous, Continuous        Allergies: No Known Allergies    History reviewed. No pertinent past medical history.   Past Surgical History:   Procedure Laterality Date           DELIVERY ONLY      CHOLECYSTECTOMY      LAPAROSCOPIC CHOLECYSTECTOMY N/A 2019     Family History   Problem Relation Age of Onset    Cancer Cousin     Breast Cancer Cousin 36    Cancer Other 79        colon cancer     Social History    Tobacco Use      Smoking status: Never      Smokeless tobacco: Never    Alcohol use: Yes      Comment: socially        SYSTEM Check if Review is Normal Check if Physical Exam is Normal If not normal, please explain:   MANISHA Amezquita ] [ Cooper Amezquita ] [ Corry Amezquita ] [ Dawit Amezquita ] [ Colene Lombard Kasey.Mari ] [ Geovanna Amezquita ] [ X]    OTHER        I have discussed the risks and benefits and alternatives of the procedure with the patient/family. They understand and agree to proceed with plan of care. I have reviewed the History and Physical done within the last 30 days. Any changes noted above.     Gonzalo Morin MD  [facility], [place], [facility] - Gastroenterology  9/20/2023

## 2023-10-16 ENCOUNTER — OFFICE VISIT (OUTPATIENT)
Dept: FAMILY MEDICINE CLINIC | Facility: CLINIC | Age: 43
End: 2023-10-16
Payer: COMMERCIAL

## 2023-10-16 VITALS
BODY MASS INDEX: 34.55 KG/M2 | DIASTOLIC BLOOD PRESSURE: 70 MMHG | SYSTOLIC BLOOD PRESSURE: 118 MMHG | WEIGHT: 215 LBS | HEART RATE: 77 BPM | OXYGEN SATURATION: 99 % | HEIGHT: 66 IN

## 2023-10-16 DIAGNOSIS — E55.9 VITAMIN D DEFICIENCY: ICD-10-CM

## 2023-10-16 DIAGNOSIS — N39.0 RECURRENT UTI: Primary | ICD-10-CM

## 2023-10-16 DIAGNOSIS — Z28.21 INFLUENZA VACCINATION DECLINED BY PATIENT: ICD-10-CM

## 2023-10-16 DIAGNOSIS — Z00.00 ADULT GENERAL MEDICAL EXAMINATION: ICD-10-CM

## 2023-10-16 PROBLEM — K80.20 CALCULUS OF GALLBLADDER WITHOUT CHOLECYSTITIS WITHOUT OBSTRUCTION: Status: RESOLVED | Noted: 2019-11-08 | Resolved: 2023-10-16

## 2023-10-16 LAB
APPEARANCE: CLEAR
BILIRUBIN: NEGATIVE
GLUCOSE (URINE DIPSTICK): NEGATIVE MG/DL
KETONES (URINE DIPSTICK): NEGATIVE MG/DL
LEUKOCYTES: NEGATIVE
MULTISTIX LOT#: NORMAL NUMERIC
NITRITE, URINE: NEGATIVE
OCCULT BLOOD: NEGATIVE
PH, URINE: 7 (ref 4.5–8)
PROTEIN (URINE DIPSTICK): NEGATIVE MG/DL
SPECIFIC GRAVITY: 1.01 (ref 1–1.03)
URINE-COLOR: YELLOW
UROBILINOGEN,SEMI-QN: 0.2 MG/DL (ref 0–1.9)

## 2023-10-16 RX ORDER — NITROFURANTOIN MACROCRYSTALS 50 MG/1
50 CAPSULE ORAL DAILY PRN
Qty: 30 CAPSULE | Refills: 0 | Status: SHIPPED | OUTPATIENT
Start: 2023-10-16

## 2023-10-18 LAB
ABSOLUTE BASOPHILS: 32 CELLS/UL (ref 0–200)
ABSOLUTE EOSINOPHILS: 348 CELLS/UL (ref 15–500)
ABSOLUTE LYMPHOCYTES: 2599 CELLS/UL (ref 850–3900)
ABSOLUTE MONOCYTES: 450 CELLS/UL (ref 200–950)
ABSOLUTE NEUTROPHILS: 4471 CELLS/UL (ref 1500–7800)
ALBUMIN/GLOBULIN RATIO: 1.2 (CALC) (ref 1–2.5)
ALBUMIN: 4.1 G/DL (ref 3.6–5.1)
ALKALINE PHOSPHATASE: 80 U/L (ref 31–125)
ALT: 27 U/L (ref 6–29)
AST: 22 U/L (ref 10–30)
BASOPHILS: 0.4 %
BILIRUBIN, TOTAL: 0.5 MG/DL (ref 0.2–1.2)
BUN: 10 MG/DL (ref 7–25)
CALCIUM: 9.4 MG/DL (ref 8.6–10.2)
CARBON DIOXIDE: 25 MMOL/L (ref 20–32)
CHLORIDE: 105 MMOL/L (ref 98–110)
CHOL/HDLC RATIO: 3.6 (CALC)
CHOLESTEROL, TOTAL: 184 MG/DL
CREATININE: 0.71 MG/DL (ref 0.5–0.99)
EGFR: 109 ML/MIN/1.73M2
EOSINOPHILS: 4.4 %
GLOBULIN: 3.4 G/DL (CALC) (ref 1.9–3.7)
GLUCOSE: 89 MG/DL (ref 65–99)
HDL CHOLESTEROL: 51 MG/DL
HEMATOCRIT: 39.4 % (ref 35–45)
HEMOGLOBIN A1C: 5.2 % OF TOTAL HGB
HEMOGLOBIN: 12.6 G/DL (ref 11.7–15.5)
LDL-CHOLESTEROL: 109 MG/DL (CALC)
LYMPHOCYTES: 32.9 %
MCH: 25.7 PG (ref 27–33)
MCHC: 32 G/DL (ref 32–36)
MCV: 80.4 FL (ref 80–100)
MONOCYTES: 5.7 %
MPV: 9.8 FL (ref 7.5–12.5)
NEUTROPHILS: 56.6 %
NON-HDL CHOLESTEROL: 133 MG/DL (CALC)
PLATELET COUNT: 327 THOUSAND/UL (ref 140–400)
POTASSIUM: 4.4 MMOL/L (ref 3.5–5.3)
PROTEIN, TOTAL: 7.5 G/DL (ref 6.1–8.1)
RDW: 14.3 % (ref 11–15)
RED BLOOD CELL COUNT: 4.9 MILLION/UL (ref 3.8–5.1)
SODIUM: 137 MMOL/L (ref 135–146)
TRIGLYCERIDES: 128 MG/DL
VITAMIN D, 25-OH, TOTAL: 37 NG/ML (ref 30–100)
WHITE BLOOD CELL COUNT: 7.9 THOUSAND/UL (ref 3.8–10.8)

## 2023-10-23 ENCOUNTER — OFFICE VISIT (OUTPATIENT)
Dept: SURGERY | Facility: CLINIC | Age: 43
End: 2023-10-23
Payer: COMMERCIAL

## 2023-10-23 DIAGNOSIS — N39.0 RECURRENT UTI: Primary | ICD-10-CM

## 2023-10-23 LAB
BILIRUB UR QL: NEGATIVE
CLARITY UR: CLEAR
COLOR UR: COLORLESS
GLUCOSE UR-MCNC: NORMAL MG/DL
KETONES UR-MCNC: NEGATIVE MG/DL
LEUKOCYTE ESTERASE UR QL STRIP.AUTO: NEGATIVE
NITRITE UR QL STRIP.AUTO: NEGATIVE
PH UR: 6.5 [PH] (ref 5–8)
PROT UR-MCNC: NEGATIVE MG/DL
SP GR UR STRIP: <1.005 (ref 1–1.03)
UROBILINOGEN UR STRIP-ACNC: NORMAL

## 2023-10-23 PROCEDURE — 99203 OFFICE O/P NEW LOW 30 MIN: CPT | Performed by: NURSE PRACTITIONER

## 2023-10-23 NOTE — PROGRESS NOTES
[facility], [facility] Urology  Initial Office Consultation    HPI:   Margo Ibrahim is a 41year old female here today for recurrent UTI. She reports that she has chronic UTI every other month for many years. She feels certain foods, being in the pool, during and after intercourse she will feel like it \"burns, this occurs with or without symptoms of UTI. She reports noticing it when she drinks coffee, carbonated beverages, when she eats chocolate. She has discussed this with GYN. She reports that when she has a UTI she starts to feel bloated, and then will have dysuria. She reports she will have chills with the symptoms. She denies back pain with the symptoms. She denies hematuria with her symptoms. Her GYN recommended antibiotics after intercourse. Drinks 4-5 40oz bottles of water. Feels thirsty often. Today she reports feeling fine. She is not on abx at this time. She has had pregnancy. 2 C-sections. 3rd pregnancy was miscarriage. She is an . No past medical history on file.   Past Surgical History:   Procedure Laterality Date           delivery only      Cholecystectomy      Colonoscopy N/A 2023    Procedure: COLONOSCOPY /ESOPHAGOGASTRODUODENOSCOPY with biopsy;  Surgeon: Livia Davidson MD;  Location: [address] [place] [address] ENDOSCOPY    Laparoscopic cholecystectomy N/A 2019     Family History   Problem Relation Age of Onset    Endometrial Cancer Sister     Hypertension Brother     Hyperlipidemia Brother     Cancer Cousin     Breast Cancer Cousin 36    Cancer Other 79        colon cancer     Social History     Socioeconomic History    Marital status:    Tobacco Use    Smoking status: Never    Smokeless tobacco: Never   Vaping Use    Vaping Use: Never used   Substance and Sexual Activity    Alcohol use: Yes     Comment: socially    Drug use: No     Current Outpatient Medications   Medication Sig Dispense Refill    nitrofurantoin macrocrystal 50 MG Oral Cap Take 1 capsule (50 mg total) by mouth daily as needed. Take after intercourse 30 capsule 0    ergocalciferol 1.25 MG (75432 UT) Oral Cap Take 1 capsule (50,000 Units total) by mouth once a week. Ferrous Sulfate 324 MG Oral Tab EC ferrous sulfate 324 mg (65 mg iron) tablet,delayed release   TAKE 1 TABLET BY MOUTH TWICE A DAY      Omeprazole 40 MG Oral Capsule Delayed Release Take 1 capsule (40 mg total) by mouth daily. Take 1 capsule by mouth daily before breakfast. 60 capsule 6       Allergies: Patient has no known allergies. REVIEW OF SYSTEMS:  Review of Systems   Constitutional:  Negative for chills, fatigue and fever. Respiratory:  Negative for chest tightness and shortness of breath. Cardiovascular:  Negative for chest pain and leg swelling. Gastrointestinal:  Negative for abdominal pain, nausea and vomiting. Genitourinary:  Negative for dysuria, flank pain, frequency, hematuria, pelvic pain and urgency. EXAM:  LMP 10/19/2023  LMP: 10/19/2023  Physical Exam  Constitutional:       Appearance: Normal appearance. HENT:      Head: Normocephalic and atraumatic. Nose: Nose normal.   Eyes:      General: No scleral icterus. Conjunctiva/sclera: Conjunctivae normal.   Cardiovascular:      Rate and Rhythm: Normal rate. Pulmonary:      Effort: Pulmonary effort is normal.   Abdominal:      Palpations: Abdomen is soft. Tenderness: There is no abdominal tenderness. There is no right CVA tenderness or left CVA tenderness. Neurological:      Mental Status: She is alert. Psychiatric:         Mood and Affect: Mood normal.         Behavior: Behavior normal.         Thought Content:  Thought content normal.         Judgment: Judgment normal.          LABS:  No results found for: \"PSA\", \"QPSA\", \"TOTPSASCREEN\"  Lab Results   Component Value Date    WBC 7.9 10/17/2023    RBC 4.90 10/17/2023    HGB 12.6 10/17/2023    HCT 39.4 10/17/2023    MCV 80.4 10/17/2023    MCH 25.7 (L) 10/17/2023    MCHC 32.0 10/17/2023 RDW 14.3 10/17/2023     10/17/2023     Lab Results   Component Value Date    GLU 89 10/17/2023    BUN 10 10/17/2023    BUNCREA SEE NOTE: 10/17/2023    CREATSERUM 0.71 10/17/2023    ANIONGAP 7 10/31/2019    GFRNAA 85 10/31/2019    GFRAA 98 10/31/2019    CA 9.4 10/17/2023     10/17/2023    K 4.4 10/17/2023     10/17/2023    CO2 25 10/17/2023     No results found for: \"PTP\", \"PT\", \"INR\"      Assessment and Plan   1) Recurrent UTI's   - the patient has recurrent UTI's and symptoms of dysuria. She has seen her PCP and her GYN who have both advised Macrobin 50mg PRN after intercourse. She is following this recommendation. Advised that she continue this.   -At this time she is with out symptoms however she wanted to establish care with a Urologist to evaluate what may be causing recurrent UTI's  - We will obtain a urine for UA and culture today   -Order an out patient RBUS   - Discussed kegel exercises. - advised that she call us when symptoms of dysuria/UTI recur so that we can order UA and culture to further evaluate. 2) Symptoms of Dysuria  - Per her history it appears that she has symptoms to bladder irritants, such as carbonated beverages, and caffeine. We discussed avoidance of bladder irritants, caffeine, citrus, carbonated beverages. As well as pools if possible. I will notify the patient of the results of the UA, Urine culture and RBUS via my chart. If anything is abnormal requiring follow up we will arrange for follow up. Otherwise the patient is to call us when she has symptoms of a UTI/dysuria so that we may order UA and Culture. She can follow up with us on an as needed basis.      Elle Hidalgo PA-C  10/23/2023 2:06 PM

## 2023-11-16 ENCOUNTER — HOSPITAL ENCOUNTER (OUTPATIENT)
Dept: ULTRASOUND IMAGING | Facility: HOSPITAL | Age: 43
Discharge: HOME | End: 2023-11-16
Attending: PHYSICIAN ASSISTANT
Payer: COMMERCIAL

## 2023-11-16 DIAGNOSIS — N39.0 RECURRENT UTI: ICD-10-CM

## 2023-11-16 PROCEDURE — 76770 US EXAM ABDO BACK WALL COMP: CPT | Performed by: PHYSICIAN ASSISTANT

## 2023-11-27 ENCOUNTER — TELEPHONE (OUTPATIENT)
Dept: FAMILY MEDICINE CLINIC | Facility: CLINIC | Age: 43
End: 2023-11-27

## 2023-11-27 NOTE — TELEPHONE ENCOUNTER
I spoke with pt, reports symptom onset Friday, felt anxious. When she gets anxious, she scratches ear with finger. R ear was swollen and had pain 10/10. Reports pain to touch and pain when lying on R side of head. Unable to hear well. Ear feels like it's clogged. Taking ibuprofen prn which has been helping. Could feel liquid in R ear fri/sat. Not as much liquid on Sunday. Does report swelling has gone down. Said she still has some pain. Then reported pain 0/10. Admits to cleaning ear with qtip. Still having trouble hearing out of R ear. Denies discharge from ear, fever, dizzy, lightheaded. Pt agreeable to in-person appt tomorrow with Dr. Saadia Espinosa. Declined appt today with [facility].

## 2023-12-28 ENCOUNTER — TELEPHONE (OUTPATIENT)
Dept: SURGERY | Facility: CLINIC | Age: 43
End: 2023-12-28

## 2023-12-28 ENCOUNTER — OFFICE VISIT (OUTPATIENT)
Dept: SURGERY | Facility: CLINIC | Age: 43
End: 2023-12-28
Payer: COMMERCIAL

## 2023-12-28 DIAGNOSIS — N39.0 RECURRENT UTI: ICD-10-CM

## 2023-12-28 DIAGNOSIS — R30.0 DYSURIA: Primary | ICD-10-CM

## 2023-12-28 LAB
BILIRUB UR QL: NEGATIVE
BILIRUBIN: NEGATIVE
CLARITY UR: CLEAR
GLUCOSE (URINE DIPSTICK): NEGATIVE MG/DL
GLUCOSE UR-MCNC: NORMAL MG/DL
KETONES (URINE DIPSTICK): NEGATIVE MG/DL
KETONES UR-MCNC: NEGATIVE MG/DL
LEUKOCYTE ESTERASE UR QL STRIP.AUTO: 250
MULTISTIX LOT#: ABNORMAL NUMERIC
NITRITE UR QL STRIP.AUTO: NEGATIVE
NITRITE, URINE: NEGATIVE
PH UR: 6 [PH] (ref 5–8)
PH, URINE: 6 (ref 4.5–8)
PROT UR-MCNC: NEGATIVE MG/DL
PROTEIN (URINE DIPSTICK): NEGATIVE MG/DL
SP GR UR STRIP: 1.01 (ref 1–1.03)
SPECIFIC GRAVITY: 1.01 (ref 1–1.03)
URINE-COLOR: YELLOW
UROBILINOGEN UR STRIP-ACNC: NORMAL
UROBILINOGEN,SEMI-QN: 0.2 MG/DL (ref 0–1.9)

## 2023-12-28 PROCEDURE — 99213 OFFICE O/P EST LOW 20 MIN: CPT | Performed by: PHYSICIAN ASSISTANT

## 2023-12-28 PROCEDURE — 81003 URINALYSIS AUTO W/O SCOPE: CPT | Performed by: PHYSICIAN ASSISTANT

## 2023-12-28 RX ORDER — METHENAMINE, SODIUM PHOSPHATE, MONOBASIC, MONOHYDRATE, PHENYL SALICYLATE, METHYLENE BLUE, AND HYOSCYAMINE SULFATE 118; 40.8; 36; 10; .12 MG/1; MG/1; MG/1; MG/1; MG/1
1 CAPSULE ORAL 2 TIMES DAILY
Qty: 60 CAPSULE | Refills: 0 | Status: SHIPPED | OUTPATIENT
Start: 2023-12-28 | End: 2024-01-27

## 2023-12-28 RX ORDER — CEPHALEXIN 250 MG/1
250 CAPSULE ORAL AS NEEDED
Qty: 30 CAPSULE | Refills: 5 | Status: SHIPPED | OUTPATIENT
Start: 2023-12-28 | End: 2024-01-27

## 2023-12-28 NOTE — PROGRESS NOTES
[facility], [facility] Urology  Initial Office Consultation    HPI:   Gloria Godfrey is a 41year old female here today for recurrent UTI. Complaining of chills. Feels burning that started 4-5 am this morning. Vomited this morning. She reports feeling indigestion. No hematuria. She reports a bad odor to urine. She reports that she feels it could be related to the meals that she ate. She had orange juice, soda. Macrobid with intercourse is hurting abdomen. Urgency and frequency associated as well. Noticed leakage yesterday as well. No past medical history on file. Past Surgical History:   Procedure Laterality Date           delivery only      Cholecystectomy      Colonoscopy N/A 2023    Procedure: COLONOSCOPY /ESOPHAGOGASTRODUODENOSCOPY with biopsy;  Surgeon: Shimon Breaux MD;  Location: [address] [place] [address] ENDOSCOPY    Laparoscopic cholecystectomy N/A 2019     Family History   Problem Relation Age of Onset    Endometrial Cancer Sister     Hypertension Brother     Hyperlipidemia Brother     Cancer Cousin     Breast Cancer Cousin 36    Cancer Other 79        colon cancer     Social History     Socioeconomic History    Marital status:    Tobacco Use    Smoking status: Never    Smokeless tobacco: Never   Vaping Use    Vaping Use: Never used   Substance and Sexual Activity    Alcohol use: Yes     Comment: socially    Drug use: No     Current Outpatient Medications   Medication Sig Dispense Refill    ergocalciferol 1.25 MG (15685 UT) Oral Cap Take 1 capsule (50,000 Units total) by mouth once a week. Ferrous Sulfate 324 MG Oral Tab EC ferrous sulfate 324 mg (65 mg iron) tablet,delayed release   TAKE 1 TABLET BY MOUTH TWICE A DAY      Omeprazole 40 MG Oral Capsule Delayed Release Take 1 capsule (40 mg total) by mouth daily. Take 1 capsule by mouth daily before breakfast. 60 capsule 6    nitrofurantoin macrocrystal 50 MG Oral Cap Take 1 capsule (50 mg total) by mouth daily as needed.  Take after intercourse (Patient not taking: Reported on 12/28/2023) 30 capsule 0       Allergies: Patient has no known allergies. REVIEW OF SYSTEMS:  Review of Systems   Constitutional:  Positive for chills. Negative for fatigue and fever. Respiratory:  Negative for chest tightness and shortness of breath. Cardiovascular:  Negative for chest pain and leg swelling. Gastrointestinal:  Negative for abdominal pain, nausea and vomiting. Genitourinary:  Positive for dysuria, frequency and urgency. Negative for flank pain, hematuria and pelvic pain. EXAM:  LMP 10/19/2023  LMP: 10/19/2023  Physical Exam  Constitutional:       Appearance: Normal appearance. HENT:      Head: Normocephalic and atraumatic. Nose: Nose normal.   Eyes:      General: No scleral icterus. Conjunctiva/sclera: Conjunctivae normal.   Cardiovascular:      Rate and Rhythm: Normal rate. Pulmonary:      Effort: Pulmonary effort is normal.   Abdominal:      Palpations: Abdomen is soft. Tenderness: There is no abdominal tenderness. There is no right CVA tenderness or left CVA tenderness. Neurological:      Mental Status: She is alert. Psychiatric:         Mood and Affect: Mood normal.         Behavior: Behavior normal.         Thought Content:  Thought content normal.         Judgment: Judgment normal.          LABS:  No results found for: \"PSA\", \"QPSA\", \"TOTPSASCREEN\"  Lab Results   Component Value Date    WBC 7.9 10/17/2023    RBC 4.90 10/17/2023    HGB 12.6 10/17/2023    HCT 39.4 10/17/2023    MCV 80.4 10/17/2023    MCH 25.7 (L) 10/17/2023    MCHC 32.0 10/17/2023    RDW 14.3 10/17/2023     10/17/2023     Lab Results   Component Value Date    GLU 89 10/17/2023    BUN 10 10/17/2023    BUNCREA SEE NOTE: 10/17/2023    CREATSERUM 0.71 10/17/2023    ANIONGAP 7 10/31/2019    GFRNAA 85 10/31/2019    GFRAA 98 10/31/2019    CA 9.4 10/17/2023     10/17/2023    K 4.4 10/17/2023     10/17/2023    CO2 25 10/17/2023     No results found for: \"PTP\", \"PT\", \"INR\"      Assessment and Plan   41year old female with symptoms of an acute UTI. Also may be secondary to interstitial cystitis. She noticed symptoms occurred when she had bladder irritants. Plan  -Office urine dip was negative. Will send off for UA and Culture. Started patient on uribel now.  If UA and Culture positive for infection will treat appropriately.   -Stop macrobid post-coital. Start Keflex 250 mg PO post coital.     Brooklynn Charlton PA-C  10/23/2023 2:06 PM

## 2023-12-28 NOTE — TELEPHONE ENCOUNTER
Per pt has all the symptoms of a uti and asking to speak to a nurse, pt refused appt.  Please Jeffy Calloway

## 2023-12-28 NOTE — TELEPHONE ENCOUNTER
-S/w pt; identity verified with name & .  -Pt made aware [facility] moved; agreeable to establish care w/ Burnard New Glaruss PAC,  -Encounter complete.

## 2023-12-29 ENCOUNTER — PATIENT MESSAGE (OUTPATIENT)
Dept: SURGERY | Facility: CLINIC | Age: 43
End: 2023-12-29

## 2024-01-02 ENCOUNTER — TELEPHONE (OUTPATIENT)
Dept: SURGERY | Facility: CLINIC | Age: 44
End: 2024-01-02

## 2024-01-02 DIAGNOSIS — R31.29 MICROSCOPIC HEMATURIA: Primary | ICD-10-CM

## 2024-01-02 NOTE — TELEPHONE ENCOUNTER
I called and spoke with the patient regarding her lab results. The patient had 3-5 RBCs on UA. Her Urine culture was likely contamination. The patient reports that she has seen blood in her urine in the past. Reports that it was bright red, like a period. This was probably 1 year ago. At this time the patient still has symptoms of dysuria.   We discussed diagnosis and definition of asymptomatic microscopic hematuria.  We went over the relevant anatomy as well as the different possible etiologies and differential diagnoses including benign causes such as infections, stones. We also discussed more serious potential causes including malignancy or tumors in the upper or lower urinary tracts.    I then discussed the proposed workup for microscopic hematuria.  This includes a voided urine sample for cytology, a CT-Urogram to evaluate the upper urinary tracts, as well as a cystoscopy to evaluate the bladder and urethra.    Further management and recommendations will be based on the results of the workup as outlined above. The patient wishes to proceed with the workup as discussed.  They asked appropriate questions all of which were answered to their satisfaction.    I advised her that our office staff will call to schedule a cystoscopy. If she does not hear from us, she is to call to schedule a follow up.

## 2024-01-02 NOTE — TELEPHONE ENCOUNTER
Per pt wants to verify it is ok that she will be on her menstrual cycle during cystoscopy. Please advise

## 2024-01-03 ENCOUNTER — TELEPHONE (OUTPATIENT)
Dept: SURGERY | Facility: CLINIC | Age: 44
End: 2024-01-03

## 2024-01-03 NOTE — TELEPHONE ENCOUNTER
Per Wendy asking for order for ct abdomen and pelvis with and without contrast to go to [facility] imaging fax: [phone]

## 2024-01-10 ENCOUNTER — PROCEDURE (OUTPATIENT)
Dept: SURGERY | Facility: CLINIC | Age: 44
End: 2024-01-10
Payer: COMMERCIAL

## 2024-01-10 VITALS — HEART RATE: 71 BPM | SYSTOLIC BLOOD PRESSURE: 143 MMHG | DIASTOLIC BLOOD PRESSURE: 86 MMHG

## 2024-01-10 PROCEDURE — 52000 CYSTOURETHROSCOPY: CPT | Performed by: UROLOGY

## 2024-01-10 PROCEDURE — 3077F SYST BP >= 140 MM HG: CPT | Performed by: UROLOGY

## 2024-01-10 PROCEDURE — 3079F DIAST BP 80-89 MM HG: CPT | Performed by: UROLOGY

## 2024-01-10 NOTE — PROCEDURES
CYSTOSCOPY (FEMALE)    PRE-OP DIAGNOSIS: Khari, hematuria    POST-OP DIAGNOSIS: same    PROCEDURE: Cystsocopy    SURGEON: Mindy Cobb MD    ASSISTANT: none     EBL: minimal    FINDINGS:   Urethra: No urethral lesions, no urethral strictures  Bladder: Bilateral ureteral orifices in orthotopic position with efflux, no suspicious or concerning erythematous lesions, no papillary bladder tumors, no stones   Retroflexion: no abnormalities   Other findings: none    INDICATIONS: Khari    PROCEDURE: Patient was brought to the procedure suite and a timeout was performed identifying the patient,  and procedure being performed.  The risks of the procedure were once again detailed to the patient including bleeding, infection, dysuria.  The patient agreed to proceed.  The patient had a negative urinalysis.  No antibiotics were given to patient prior to this procedure.     She was placed in a supine position on the table and a flexible cystoscope was inserted per urethra.  There were no obvious urethral lesions or strictures. Once in the bladder we performed a full diagnostic/surveillance cystoscopy which demonstrated no abnormalities.  On retroflexion we noted no abnormalities.  The scope was then carefully removed and once again no urethral abnormalities were noted.    There were no complications after this procedure and the patient tolerated the procedure without issue.    IMPRESSION:cysto negative    PLAN:    CTU completed today  Follow up with amal to review CT

## 2024-01-10 NOTE — PROGRESS NOTES
We received a fax from [facility] with the CT report from this morning. I gave the report to Keturah and will take the CD to get downloaded into the system. Pt has a follow up appt with Keturah on Monday, 1/15.

## 2024-01-15 ENCOUNTER — OFFICE VISIT (OUTPATIENT)
Dept: SURGERY | Facility: CLINIC | Age: 44
End: 2024-01-15
Payer: COMMERCIAL

## 2024-01-15 DIAGNOSIS — R30.0 DYSURIA: Primary | ICD-10-CM

## 2024-01-15 LAB
CLARITY UR: CLEAR
HYALINE CASTS #/AREA URNS AUTO: PRESENT /LPF
SP GR UR REFRACTOMETRY: 1.01 (ref 1–1.03)

## 2024-01-15 PROCEDURE — 99213 OFFICE O/P EST LOW 20 MIN: CPT | Performed by: PHYSICIAN ASSISTANT

## 2024-01-15 RX ORDER — METHENAMINE, SODIUM PHOSPHATE, MONOBASIC, MONOHYDRATE, PHENYL SALICYLATE, METHYLENE BLUE, AND HYOSCYAMINE SULFATE 118; 40.8; 36; 10; .12 MG/1; MG/1; MG/1; MG/1; MG/1
1 CAPSULE ORAL 4 TIMES DAILY
Qty: 120 CAPSULE | Refills: 3 | Status: SHIPPED | OUTPATIENT
Start: 2024-01-15 | End: 2024-02-14

## 2024-01-15 NOTE — PROGRESS NOTES
[facility] Urology  Initial Office Consultation    HPI:   Bianca Bardales is a 43 year old here today for follow up. She had a cystoscopy completed on 01/10/2024 by Dr. Cobb and it was negative for any acute findings. The patient continues to have episodes of dysuria, feeling feverish and chills. Her UA with her Gyn have been positive for E.Coli. However, her cultures with us have been negative. The patient reports that since starting uribel symptoms have started to improve some. She continues to have symptoms of bloating. She continues to have symptoms of bloating which she associates with intercourse. The patient also complains of pain and burning with intercourse.     No past medical history on file.  Past Surgical History:   Procedure Laterality Date           delivery only      Cholecystectomy      Colonoscopy N/A 2023    Procedure: COLONOSCOPY /ESOPHAGOGASTRODUODENOSCOPY with biopsy;  Surgeon: aCrmen Kapadia MD;  [facility]    Laparoscopic cholecystectomy N/A 2019     Family History   Problem Relation Age of Onset    Endometrial Cancer Sister     Hypertension Brother     Hyperlipidemia Brother     Cancer Cousin     Breast Cancer Cousin 40    Cancer Other 70        colon cancer     Social History     Socioeconomic History    Marital status:    Tobacco Use    Smoking status: Never    Smokeless tobacco: Never   Vaping Use    Vaping Use: Never used   Substance and Sexual Activity    Alcohol use: Yes     Comment: socially    Drug use: No     Current Outpatient Medications   Medication Sig Dispense Refill    Meth-Hyo-M Bl-Na Phos-Ph Sal (URIBEL) 118 MG Oral Cap Take 1 tablet by mouth in the morning and 1 tablet before bedtime. 60 capsule 0    ergocalciferol 1.25 MG (53912 UT) Oral Cap Take 1 capsule (50,000 Units total) by mouth once a week.      Ferrous Sulfate 324 MG Oral Tab EC ferrous sulfate 324 mg (65 mg iron) tablet,delayed release   TAKE 1 TABLET BY  MOUTH TWICE A DAY      Omeprazole 40 MG Oral Capsule Delayed Release Take 1 capsule (40 mg total) by mouth daily. Take 1 capsule by mouth daily before breakfast. 60 capsule 6    cephalexin 250 MG Oral Cap Take 1 capsule (250 mg total) by mouth as needed. Take one tablet post coital (Patient not taking: Reported on 1/15/2024) 30 capsule 5    nitrofurantoin macrocrystal 50 MG Oral Cap Take 1 capsule (50 mg total) by mouth daily as needed. Take after intercourse (Patient not taking: Reported on 12/28/2023) 30 capsule 0       Allergies: Patient has no known allergies.    REVIEW OF SYSTEMS:  Review of Systems   Constitutional:  Negative for chills, fatigue and fever.   Respiratory:  Negative for chest tightness and shortness of breath.    Cardiovascular:  Negative for chest pain and leg swelling.   Gastrointestinal:  Negative for abdominal pain, nausea and vomiting.   Genitourinary:  Negative for dysuria, flank pain, frequency, hematuria, pelvic pain and urgency.         EXAM:  LMP 10/19/2023  LMP: 10/19/2023  Physical Exam  Constitutional:       Appearance: Normal appearance.   HENT:      Head: Normocephalic and atraumatic.      Nose: Nose normal.   Eyes:      General: No scleral icterus.     Conjunctiva/sclera: Conjunctivae normal.   Cardiovascular:      Rate and Rhythm: Normal rate.   Pulmonary:      Effort: Pulmonary effort is normal.   Neurological:      Mental Status: She is alert.   Psychiatric:         Mood and Affect: Mood normal.         Behavior: Behavior normal.         Thought Content: Thought content normal.         Judgment: Judgment normal.          LABS:  No results found for: \"PSA\", \"QPSA\", \"TOTPSASCREEN\"  Lab Results   Component Value Date    WBC 7.9 10/17/2023    RBC 4.90 10/17/2023    HGB 12.6 10/17/2023    HCT 39.4 10/17/2023    MCV 80.4 10/17/2023    MCH 25.7 (L) 10/17/2023    MCHC 32.0 10/17/2023    RDW 14.3 10/17/2023     10/17/2023     Lab Results   Component Value Date    GLU 89  10/17/2023    BUN 10 10/17/2023    BUNCREA SEE NOTE: 10/17/2023    CREATSERUM 0.71 10/17/2023    ANIONGAP 7 10/31/2019    GFRNAA 85 10/31/2019    GFRAA 98 10/31/2019    CA 9.4 10/17/2023     10/17/2023    K 4.4 10/17/2023     10/17/2023    CO2 25 10/17/2023     No results found for: \"PTP\", \"PT\", \"INR\"    Imaging:   US Kidney and bladder (11/16/2023): CONCLUSION: Normal renal ultrasound.     CT Urogram 01/10/2024: impression: No urolithiasis, hydronephrosis, or urographic abnormality.     Assessment and Plan   43 year old female with history or hematuria and recurrent UTI. Her Cystoscopy from 01/10/2024 was negative. CT urogram 01/10/2024: negative for urolithiasis, hydronephrosis, or urographic abnormality.   Plan  1) continue Keflex 250 mg PO post coital.   2) follow up with GYN regarding vaginal pain with intercourse   3) Increase uribel to 4x a day, along with lifestyle changes. Information regarding interstitial cystitis has been provided to the patient.   4) Start pelvic floor therapy.   5) follow up in 8 weeks. If symptoms are not improving, may consider adding amitriptyline to regimen.   6) Standing order in place for UA and Culture. Checked UA and culture today.     Keturah Sauceda PA-C  01/15/2024

## 2024-01-17 ENCOUNTER — TELEPHONE (OUTPATIENT)
Dept: SURGERY | Facility: CLINIC | Age: 44
End: 2024-01-17

## 2024-01-17 NOTE — TELEPHONE ENCOUNTER
----- Message from Екатерина Holley PA-C sent at 1/16/2024  5:10 PM CST -----  Doesn't look like culture was sent yesterday (maybe not sufficient volume?) Would recommend we collect again and send urine for VIKOR PCR testing. Message sent to patient.

## 2024-01-18 ENCOUNTER — TELEPHONE (OUTPATIENT)
Dept: SURGERY | Facility: CLINIC | Age: 44
End: 2024-01-18

## 2024-01-18 NOTE — TELEPHONE ENCOUNTER
Patient dropped off urine sample for Vikor Testing. Sample collected, order signed by  since Amal is out sick this week. Copy of order, patients insurance, and last office visit note collected and placed in [employer] bag.

## 2024-01-20 ENCOUNTER — PATIENT MESSAGE (OUTPATIENT)
Dept: SURGERY | Facility: CLINIC | Age: 44
End: 2024-01-20

## 2024-01-23 ENCOUNTER — PATIENT MESSAGE (OUTPATIENT)
Dept: SURGERY | Facility: CLINIC | Age: 44
End: 2024-01-23

## 2024-01-23 ENCOUNTER — TELEPHONE (OUTPATIENT)
Dept: SURGERY | Facility: CLINIC | Age: 44
End: 2024-01-23

## 2024-01-23 RX ORDER — FLUCONAZOLE 150 MG/1
150 TABLET ORAL
Qty: 3 TABLET | Refills: 0 | Status: SHIPPED | OUTPATIENT
Start: 2024-01-23

## 2024-01-23 RX ORDER — NITROFURANTOIN 25; 75 MG/1; MG/1
100 CAPSULE ORAL 2 TIMES DAILY
Qty: 14 CAPSULE | Refills: 0 | Status: SHIPPED | OUTPATIENT
Start: 2024-01-23 | End: 2024-01-30

## 2024-01-23 NOTE — TELEPHONE ENCOUNTER
I called and spoke to patient regarding results of Vikor urine testing. It was positive for E.Coli and Enterococcus faecalis. Recommendation is macrobid 100 mg BID. The patient reports feeling better on uribel.   Advised to stop uribel while on macrobid. Patient is agreeable  Keturah Sauceda PA-C

## 2024-02-13 ENCOUNTER — TELEPHONE (OUTPATIENT)
Dept: SURGERY | Facility: CLINIC | Age: 44
End: 2024-02-13

## 2024-02-13 ENCOUNTER — OFFICE VISIT (OUTPATIENT)
Dept: SURGERY | Facility: CLINIC | Age: 44
End: 2024-02-13
Payer: COMMERCIAL

## 2024-02-13 DIAGNOSIS — N39.0 RECURRENT UTI: ICD-10-CM

## 2024-02-13 DIAGNOSIS — R30.0 DYSURIA: Primary | ICD-10-CM

## 2024-02-13 PROCEDURE — 99213 OFFICE O/P EST LOW 20 MIN: CPT | Performed by: PHYSICIAN ASSISTANT

## 2024-02-13 NOTE — PROGRESS NOTES
[facility]  Initial Office Consultation    HPI:   Bianca Bardales is a 43 year old here today for follow up. She was last seen on 01/15/2024. She reports that since starting Macrobid she is feeling better. Reports that the only time she feels normal is when she is on antibiotics. She reports that she has excessive fatigue. Has to take naps when she is working. She also reports that she has had constipation., dry skin, dry mouth, abdominal bloating.   At today's visit she denies any dysuria, fever, chills, hematuria.     She denies any pain at this time. She had a cystoscopy completed on 01/10/2024 by Dr. Cobb and it was negative for any acute findings.   History reviewed. No pertinent past medical history.  Past Surgical History:   Procedure Laterality Date           delivery only      Cholecystectomy      Colonoscopy N/A 2023    Procedure: COLONOSCOPY /ESOPHAGOGASTRODUODENOSCOPY with biopsy;  Surgeon: Carmen Kapadia MD;  Location: [facility] ENDOSCOPY    Laparoscopic cholecystectomy N/A 2019     Family History   Problem Relation Age of Onset    Endometrial Cancer Sister     Hypertension Brother     Hyperlipidemia Brother     Cancer Cousin     Breast Cancer Cousin 40    Cancer Other 70        colon cancer     Social History     Socioeconomic History    Marital status:    Tobacco Use    Smoking status: Never    Smokeless tobacco: Never   Vaping Use    Vaping Use: Never used   Substance and Sexual Activity    Alcohol use: Yes     Comment: socially    Drug use: No     Current Outpatient Medications   Medication Sig Dispense Refill    Meth-Hyo-M Bl-Na Phos-Ph Sal (URIBEL) 118 MG Oral Cap Take 1 capsule by mouth in the morning, at noon, in the evening, and at bedtime. 120 capsule 3    nitrofurantoin macrocrystal 50 MG Oral Cap Take 1 capsule (50 mg total) by mouth daily as needed. Take after intercourse 30 capsule 0    ergocalciferol 1.25 MG (00385 UT) Oral Cap Take 1 capsule  (50,000 Units total) by mouth once a week.      Ferrous Sulfate 324 MG Oral Tab EC ferrous sulfate 324 mg (65 mg iron) tablet,delayed release   TAKE 1 TABLET BY MOUTH TWICE A DAY      Omeprazole 40 MG Oral Capsule Delayed Release Take 1 capsule (40 mg total) by mouth daily. Take 1 capsule by mouth daily before breakfast. 60 capsule 6    fluconazole (DIFLUCAN) 150 MG Oral Tab Take 1 tablet (150 mg total) by mouth every third day. (Patient not taking: Reported on 2/13/2024) 3 tablet 0       Allergies: Patient has no known allergies.    REVIEW OF SYSTEMS:  A comprehensive 10 point review of systems was completed.  Pertinent positives and negatives noted in the the HPI.     EXAM:  LMP 10/19/2023  LMP: 10/19/2023  Physical Exam  Constitutional:       Appearance: Normal appearance.   HENT:      Head: Normocephalic and atraumatic.      Nose: Nose normal.   Eyes:      General: No scleral icterus.     Conjunctiva/sclera: Conjunctivae normal.   Cardiovascular:      Rate and Rhythm: Normal rate.   Pulmonary:      Effort: Pulmonary effort is normal.   Abdominal:      General: There is no distension.      Palpations: Abdomen is soft.      Tenderness: There is no abdominal tenderness. There is no right CVA tenderness or left CVA tenderness.   Neurological:      Mental Status: She is alert and oriented to person, place, and time.   Psychiatric:         Mood and Affect: Mood normal.         Behavior: Behavior normal.         Thought Content: Thought content normal.         Judgment: Judgment normal.          LABS:  No results found for: \"PSA\", \"QPSA\", \"TOTPSASCREEN\"  Lab Results   Component Value Date    WBC 7.9 10/17/2023    RBC 4.90 10/17/2023    HGB 12.6 10/17/2023    HCT 39.4 10/17/2023    MCV 80.4 10/17/2023    MCH 25.7 (L) 10/17/2023    MCHC 32.0 10/17/2023    RDW 14.3 10/17/2023     10/17/2023     Lab Results   Component Value Date    GLU 89 10/17/2023    BUN 10 10/17/2023    BUNCREA SEE NOTE: 10/17/2023    CREATSERUM  0.71 10/17/2023    ANIONGAP 7 10/31/2019    GFRNAA 85 10/31/2019    GFRAA 98 10/31/2019    CA 9.4 10/17/2023     10/17/2023    K 4.4 10/17/2023     10/17/2023    CO2 25 10/17/2023     No results found for: \"PTP\", \"PT\", \"INR\"    Imaging:   US Kidney and bladder (11/16/2023): CONCLUSION: Normal renal ultrasound.     CT Urogram 01/10/2024: impression: No urolithiasis, hydronephrosis, or urographic abnormality.     Assessment and Plan   43 year old female with history or hematuria and recurrent UTI. Her Cystoscopy from 01/10/2024 was negative. CT urogram 01/10/2024: negative for urolithiasis, hydronephrosis, or urographic abnormality. At this time reports doing well from a urological standpoint. However she shared many symptoms with me that are concerning for Hashimoto's thyroiditis. I discussed I would share these symptoms and thoughts with her PCP In addition I advise that she have a pelvic exam completed as well by her GYN.   Plan  1) continue Keflex 250 mg PO post coital.   2) follow up with GYN regarding vaginal pain with intercourse, pelvic bloating. Complete a pelvic exam and at their discretion maybe a pelvic US   3) continue behavioral therapy. She is not using uribel. Discussed using D-mannose and cranberry supplements.   4) Standing order in place for UA and Culture. Checked UA and culture today.   5) Follow up with PCP for other symptoms. May need to have thyroid panel and thyroid peroxidase antibodies checked at their discretion.     Keturah Sauceda PA-C  February 13, 2024

## 2024-02-13 NOTE — PATIENT INSTRUCTIONS
Urinary tract infections can affect your general health and your quality of life.  Some UTI’s can be prevented.  Here are some suggestions that my help prevent frequent UTI’s.     Good Hygiene: Always wipe front to back.  Don’t use perfumed soaps.  Plain soaps like Ivory are the best.  Don’t use washcloths.  Place soap directly on your hands and clean the genital area.  Rinse thoroughly.  Soap can be very irritating to the vaginal mucosa.     Urination: Urinate every 2-3 hours during the day.  Empty your bladder just before going to bed and  first thing when you wake up.  Make sure you go to the bathroom when you have a strong urge. Don't hover over the toilet to urinate.  The bladder may not be completely emptying when using this technique.  Sometimes you may need to \"double-void\".  After you finish urinating, stand up, then sit back down to urinate again.     Clothing: Wear cotton underwear. Change daily.  Avoid tight jeans.       [place]: Sometimes UTI’s are related to intercourse.  Wash genital area before and rinse afterwards.  Empty your bladder before and after intercourse.  Use of vaginal lubricants may be helpful.  Condoms and some lubricants may be irritating to the vaginal mucosa.  Spermicide should be avoided.  Talk to your doctor, you may require a suppressive antibiotic to take after intercourse.     Supplements: Take Vitamin C 500 or 1000mg daily.  Cranberry pills 400mg daily or if symptomatic 400mg two times per day.  Eat yogurt or consider taking a probiotic.  D-mannose 1 gram is another supplement that can help prevent infections.  This one is harder to find, but can be found at stores such as [facility], [employer], or a specialized vitamin store.  Fluid intake should be at least 48oz daily with the goal of 64oz daily.  Water is preferable.      Constipation: Constipation has been linked to UTI’s.  You should be having a soft BM daily.  Dietary fiber should be between 20-25 grams daily.   Flaxseed, All-Bran cereal, Fiber One bars, fruits and vegetables are a good sources of fiber.  Alternatively, Metamucil can be used daily.  Gentle laxatives and stool softeners may be added on a daily or as needed basis if necessary (Miralax, Colace, Pericolace).      Vaginal creams and moisturizers: It may be recommended you try vaginal creams, moisturizers or lubricants as a prevention method.  Over-the-Counter products:  Replens:  1 applicator three times per week  Luvena prebiotic vaginal moisturizer and lubricant:  package of 6.  1 tube every three days at bedtime.  Helps restore pH balance, decrease vaginal dryness, odor and itchiness.  KY liquibeads  KY silk - moisture enhancer  MeAgain Vaginal Moisturizer.  8 per package.  Use 1 daily for 7 days then 1 daily two times per week.  Astroglide  Prescriptions:  Estrace Cream  Premarin Cream  E-string     Prescription medications: Your doctor may recommend daily or as needed prescription medications including antibiotics to prevent urinary tract infection as well.

## 2024-02-13 NOTE — TELEPHONE ENCOUNTER
Patient seen in office today. Per Amal will send out urine test to [facility]. Collected and placed in [employer] bag.

## 2024-02-14 ENCOUNTER — TELEPHONE (OUTPATIENT)
Dept: FAMILY MEDICINE CLINIC | Facility: CLINIC | Age: 44
End: 2024-02-14

## 2024-02-14 NOTE — TELEPHONE ENCOUNTER
Patient saw a different provider and recommended follow-up with PCP. Can you please offer her an appointment? Thanks.

## 2024-02-16 ENCOUNTER — TELEPHONE (OUTPATIENT)
Dept: SURGERY | Facility: CLINIC | Age: 44
End: 2024-02-16

## 2024-02-19 ENCOUNTER — OFFICE VISIT (OUTPATIENT)
Dept: FAMILY MEDICINE CLINIC | Facility: CLINIC | Age: 44
End: 2024-02-19
Payer: COMMERCIAL

## 2024-02-19 VITALS
HEIGHT: 66 IN | HEART RATE: 66 BPM | DIASTOLIC BLOOD PRESSURE: 90 MMHG | WEIGHT: 209.38 LBS | SYSTOLIC BLOOD PRESSURE: 134 MMHG | BODY MASS INDEX: 33.65 KG/M2 | OXYGEN SATURATION: 92 %

## 2024-02-19 DIAGNOSIS — Z86.19 HISTORY OF LYME DISEASE: ICD-10-CM

## 2024-02-19 DIAGNOSIS — L65.9 HAIR THINNING: ICD-10-CM

## 2024-02-19 DIAGNOSIS — R68.2 DRY MOUTH: ICD-10-CM

## 2024-02-19 DIAGNOSIS — R14.0 BLOATING: ICD-10-CM

## 2024-02-19 DIAGNOSIS — R53.83 OTHER FATIGUE: Primary | ICD-10-CM

## 2024-02-20 NOTE — PROGRESS NOTES
Chief Complaint:   Chief Complaint   Patient presents with    Follow - Up     Recurrent UTI         HPI:   This is a 43 year old female coming in for chronic symptoms that have not improved. She reports chronic fatigue. She has trouble working for an 8-hour day without napping. Reports bloating, sometimes in face, always in abdomen. Feels cold all of the time. Hair is thinning. Has dry mouth/lips, is always thirsty no matter how much she drinks. She has chronic UTIs, and all of the above symptoms resolve while she is on antibiotics for this, but symptoms return after she finishes. She last took antibiotics 2 weeks ago. Has chronic constipation as well, but that has resolved since last antibiotics and she is having 3 bowel movements per day now. Denies joint pain, swelling, stiffness. Denies rash. Periods are regular. Sister with endometriosis. Patient reports a hx of possible RA vs chronic Lyme disease diagnosed after son was born several years ago.    Results for orders placed or performed in visit on 01/15/24   Urinalysis with Culture Reflex    Specimen: Urine   Result Value Ref Range    Urine Color Light-Green (A) Yellow    Clarity Urine Clear Clear    Spec Gravity      WBC Urine 1-5 0 - 5 /HPF    RBC Urine 0-2 0 - 2 /HPF    Bacteria Urine 2+ (A) None Seen /HPF    Squamous Epi. Cells Few (A) None Seen /HPF    Renal Tubular Epithelial Cells None Seen None Seen /HPF    Transitional Cells None Seen None Seen /HPF    Hyaline Casts Present (A) None Seen /LPF    Yeast Urine None Seen None Seen /HPF   Specific Gravity, Urine   Result Value Ref Range    Spec Gravity 1.006 1.001 - 1.030             History reviewed. No pertinent past medical history.  Past Surgical History:   Procedure Laterality Date           DELIVERY ONLY      CHOLECYSTECTOMY      COLONOSCOPY N/A 2023    Procedure: COLONOSCOPY /ESOPHAGOGASTRODUODENOSCOPY with biopsy;  Surgeon: Carmen Kapadia MD;  Location: [facility] ENDOSCOPY     LAPAROSCOPIC CHOLECYSTECTOMY N/A 11/18/2019     Social History:  Social History     Socioeconomic History    Marital status:    Tobacco Use    Smoking status: Never    Smokeless tobacco: Never   Vaping Use    Vaping Use: Never used   Substance and Sexual Activity    Alcohol use: Yes     Comment: socially    Drug use: No     Family History:  Family History   Problem Relation Age of Onset    Endometrial Cancer Sister     Hypertension Brother     Hyperlipidemia Brother     Cancer Cousin     Breast Cancer Cousin 40    Cancer Other 70        colon cancer     Allergies:  No Known Allergies  Current Meds:  Current Outpatient Medications   Medication Sig Dispense Refill    Meth-Hyo-M Bl-Na Phos-Ph Sal (URIBEL) 118 MG Oral Cap Take 1 capsule by mouth in the morning, at noon, in the evening, and at bedtime. 120 capsule 3    nitrofurantoin macrocrystal 50 MG Oral Cap Take 1 capsule (50 mg total) by mouth daily as needed. Take after intercourse 30 capsule 0    ergocalciferol 1.25 MG (65298 UT) Oral Cap Take 1 capsule (50,000 Units total) by mouth once a week.      Ferrous Sulfate 324 MG Oral Tab EC ferrous sulfate 324 mg (65 mg iron) tablet,delayed release   TAKE 1 TABLET BY MOUTH TWICE A DAY      Omeprazole 40 MG Oral Capsule Delayed Release Take 1 capsule (40 mg total) by mouth daily. Take 1 capsule by mouth daily before breakfast. 60 capsule 6      Counseling given: Not Answered       REVIEW OF SYSTEMS:   CONSTITUTIONAL:  Denies unusual weight gain/loss. +Fatigue.  INTEGUMENTARY:  Denies rashes, itching, skin lesion.  CARDIOVASCULAR:  Denies chest pain, palpitations, edema, dyspnea on exertion or at rest.  RESPIRATORY:  Denies shortness of breath, wheezing, cough or sputum.  GASTROINTESTINAL:  See HPI.  GENITOURINARY: Recurrent UTIs, sees urology.  MUSCULOSKELETAL:  Denies weakness, muscle aches, back pain, joint pain, swelling or stiffness.  NEUROLOGICAL:  Denies headache, seizures, dizziness.    EXAM:   /90    Pulse 66   Ht 5' 6\" (1.676 m)   Wt 209 lb 6.4 oz (95 kg)   LMP 02/01/2024 (Exact Date)   SpO2 92%   BMI 33.80 kg/m²  Estimated body mass index is 33.8 kg/m² as calculated from the following:    Height as of this encounter: 5' 6\" (1.676 m).    Weight as of this encounter: 209 lb 6.4 oz (95 kg).   Vital signs reviewed.Appears stated age, well groomed, in no acute distress.  Physical Exam:  GEN:  Patient is alert, awake and oriented, well developed, well nourished.  NECK: Supple, no CLAD, no thyromegaly.  SKIN: No rashes, no skin lesion. Thinning hair visible on front of scalp and crown of head.  HEART:  Regular rate and rhythm, no murmurs, rubs or gallops.  LUNGS: Clear to auscultation bilterally, no rales/rhonchi/wheezing.  ABDOMEN:  Soft, nondistended, nontender, bowel sounds normal in all 4 quadrants, no masses, no hepatosplenomegaly.  EXTREMITIES:  No edema.  NEURO:  No deficit, normal gait, strength and tone, sensory intact.    ASSESSMENT AND PLAN:   1. Other fatigue  -Will check labs below to r/o autoimmune condition, thyroid disorder, iron deficiency. Follow-up 2-4 weeks.  -Fresh air daily, regular exercise, adequate sleep.  - Assay, Thyroid Stim Hormone  - Free T4, (Free Thyroxine)  - Free T3 (Triiodothryronine)  - Iron And Tibc  - Ferritin  - HUGO IFA SCREEN W/REFL TO TITER AND PATTERN, IFA [249] [Q}    2. Bloating  -See above.  - Assay, Thyroid Stim Hormone  - Free T4, (Free Thyroxine)  - Free T3 (Triiodothryronine)  - Iron And Tibc  - Ferritin  - HUGO IFA SCREEN W/REFL TO TITER AND PATTERN, IFA [249] [Q}    3. Dry mouth  -See above.  - Assay, Thyroid Stim Hormone  - Free T4, (Free Thyroxine)  - Free T3 (Triiodothryronine)  - Iron And Tibc  - Ferritin  - HUGO IFA SCREEN W/REFL TO TITER AND PATTERN, IFA [249] [Q}    4. Hair thinning  -See above.  - Assay, Thyroid Stim Hormone  - Free T4, (Free Thyroxine)  - Free T3 (Triiodothryronine)  - Iron And Tibc  - Ferritin  - HUGO IFA SCREEN W/REFL TO TITER AND  PATTERN, IFA [249] [Q}    5. History of Lyme disease  -See above.  - Assay, Thyroid Stim Hormone  - Free T4, (Free Thyroxine)  - Free T3 (Triiodothryronine)  - Iron And Tibc  - Ferritin  - HUGO IFA SCREEN W/REFL TO TITER AND PATTERN, IFA [249] [Q}      Meds & Refills for this Visit:  Requested Prescriptions      No prescriptions requested or ordered in this encounter         Problem List:  Patient Active Problem List   Diagnosis    Recurrent UTI    Vitamin D deficiency       Estrellita Schneider, APRN  2/19/2024  6:38 PM

## 2024-02-27 LAB
% SATURATION: 15 % (CALC) (ref 16–45)
ANA SCREEN, IFA: POSITIVE
FERRITIN: 18 NG/ML (ref 16–232)
IRON BINDING CAPACITY: 322 MCG/DL (CALC) (ref 250–450)
IRON, TOTAL: 48 MCG/DL (ref 40–190)
T3, FREE: 3.2 PG/ML (ref 2.3–4.2)
T4, FREE: 1.1 NG/DL (ref 0.8–1.8)
TSH: 1.3 MIU/L

## 2024-02-28 DIAGNOSIS — R76.8 POSITIVE ANA (ANTINUCLEAR ANTIBODY): Primary | ICD-10-CM

## 2024-03-18 ENCOUNTER — OFFICE VISIT (OUTPATIENT)
Dept: FAMILY MEDICINE CLINIC | Facility: CLINIC | Age: 44
End: 2024-03-18
Payer: COMMERCIAL

## 2024-03-18 ENCOUNTER — HOSPITAL ENCOUNTER (OUTPATIENT)
Dept: GENERAL RADIOLOGY | Age: 44
Discharge: HOME | End: 2024-03-18
Attending: NURSE PRACTITIONER
Payer: COMMERCIAL

## 2024-03-18 VITALS
BODY MASS INDEX: 32.96 KG/M2 | OXYGEN SATURATION: 97 % | DIASTOLIC BLOOD PRESSURE: 78 MMHG | HEART RATE: 64 BPM | HEIGHT: 67 IN | WEIGHT: 210 LBS | SYSTOLIC BLOOD PRESSURE: 120 MMHG | TEMPERATURE: 97 F

## 2024-03-18 DIAGNOSIS — R14.0 BLOATING: Primary | ICD-10-CM

## 2024-03-18 DIAGNOSIS — R76.8 POSITIVE ANA (ANTINUCLEAR ANTIBODY): ICD-10-CM

## 2024-03-18 DIAGNOSIS — K59.01 SLOW TRANSIT CONSTIPATION: ICD-10-CM

## 2024-03-18 DIAGNOSIS — R07.89 CHEST WALL PAIN: ICD-10-CM

## 2024-03-18 DIAGNOSIS — N92.6 MISSED MENSES: ICD-10-CM

## 2024-03-18 DIAGNOSIS — Z86.19 HISTORY OF VIRAL ILLNESS: ICD-10-CM

## 2024-03-18 DIAGNOSIS — R14.0 BLOATING: ICD-10-CM

## 2024-03-18 DIAGNOSIS — R42 VERTIGO: ICD-10-CM

## 2024-03-18 LAB
CONTROL LINE PRESENT WITH A CLEAR BACKGROUND (YES/NO): YES YES/NO
KIT LOT #: NORMAL NUMERIC
PREGNANCY TEST, URINE: NEGATIVE

## 2024-03-18 PROCEDURE — 74018 RADEX ABDOMEN 1 VIEW: CPT | Performed by: NURSE PRACTITIONER

## 2024-03-18 PROCEDURE — 71046 X-RAY EXAM CHEST 2 VIEWS: CPT | Performed by: NURSE PRACTITIONER

## 2024-03-18 RX ORDER — METHYLPREDNISOLONE 4 MG/1
TABLET ORAL
Qty: 1 EACH | Refills: 0 | Status: SHIPPED | OUTPATIENT
Start: 2024-03-18

## 2024-03-18 NOTE — PATIENT INSTRUCTIONS
Eustachian tube exercises:  1. Yawn often.  2. Chewing motion with jaw 5-10 times per hour.  3. Blow up a balloon.  4. Plug your nose, close your mouth and blow out gently trying to \"pop\" your ears.  -Flonase 1 spray in each nostril twice daily. Think \"nose to toes\" and rinse mouth after use.  -Claritin 1 tablet daily OTC.    -Meclizine 12.5-25mg every 8 hours as needed for vertigo. This causes sedation-do not take before driving or with other medicines that make you tired.

## 2024-03-18 NOTE — PROGRESS NOTES
Chief Complaint:   Chief Complaint   Patient presents with    Follow - Up     Patient presents today to follow up on fatigue & bloating.         HPI:   This is a 43 year old female coming in for follow-up and new symptoms. Fatigue is slightly improved, no longer feels the need to nap during the day. Taking iron as directed. Continues to have abdominal bloating and admits to constipation-present before starting iron. Last BM was yesterday. Feels full even when she has not eaten.     Reports a viral illness 2/25 with fever and chills that lasted about a day. About a week later, she started having chest wall pain on left side of chest that occurs when she lays on this side or uses arm/pectoral muscles to carry something. +Tenderness to palpation over left/midsternal chest wall area. Feels like she cannot take a deep breath at times but this is due to pain. Has been able to walk on a treadmill or outdoors without symptoms. Since illness she also reports room-spinning sensation when she lays on right side. Resolves with looking at fixed object. Has some nausea with this but no vomiting.    +HUGO on labs, seeing rheumatology in April. Admits LMP 2/1, has had irregular periods before. She is sexually active, uses condoms for protection. Has not taken a pregnancy test.    Results for orders placed or performed in visit on 02/19/24   Assay, Thyroid Stim Hormone   Result Value Ref Range    TSH 1.30 mIU/L   Free T4, (Free Thyroxine)   Result Value Ref Range    T4, FREE 1.1 0.8 - 1.8 ng/dL   Free T3 (Triiodothryronine)   Result Value Ref Range    T3, FREE 3.2 2.3 - 4.2 pg/mL   Iron And Tibc   Result Value Ref Range    IRON, TOTAL 48 40 - 190 mcg/dL    IRON BINDING CAPACITY 322 250 - 450 mcg/dL (calc)    % SATURATION 15 (L) 16 - 45 % (calc)   Ferritin   Result Value Ref Range    FERRITIN 18 16 - 232 ng/mL   HUGO IFA SCREEN W/REFL TO TITER AND PATTERN, IFA [249] [Q}   Result Value Ref Range    HUGO SCREEN, IFA POSITIVE (A) NEGATIVE     HUGO TITER 1:160 (H) titer    HUGO PATTERN Nuclear, Homogeneous (A)     HUGO TITER 1:80 (H) titer    HUGO PATTERN Nuclear, Speckled (A)              History reviewed. No pertinent past medical history.  Past Surgical History:   Procedure Laterality Date           DELIVERY ONLY      CHOLECYSTECTOMY      COLONOSCOPY N/A 2023    Procedure: COLONOSCOPY /ESOPHAGOGASTRODUODENOSCOPY with biopsy;  Surgeon: Carmen Kapadia MD;  Location: [facility] ENDOSCOPY    LAPAROSCOPIC CHOLECYSTECTOMY N/A 2019     Social History:  Social History     Socioeconomic History    Marital status:    Tobacco Use    Smoking status: Never    Smokeless tobacco: Never   Vaping Use    Vaping Use: Never used   Substance and Sexual Activity    Alcohol use: Yes     Comment: socially    Drug use: No     Family History:  Family History   Problem Relation Age of Onset    Endometrial Cancer Sister     Hypertension Brother     Hyperlipidemia Brother     Cancer Cousin     Breast Cancer Cousin 40    Cancer Other 70        colon cancer     Allergies:  No Known Allergies  Current Meds:  Current Outpatient Medications   Medication Sig Dispense Refill    methylPREDNISolone (MEDROL) 4 MG Oral Tablet Therapy Pack As directed. 1 each 0    nitrofurantoin macrocrystal 50 MG Oral Cap Take 1 capsule (50 mg total) by mouth daily as needed. Take after intercourse 30 capsule 0    ergocalciferol 1.25 MG (96240 UT) Oral Cap Take 1 capsule (50,000 Units total) by mouth once a week.      Ferrous Sulfate 324 MG Oral Tab EC ferrous sulfate 324 mg (65 mg iron) tablet,delayed release   TAKE 1 TABLET BY MOUTH TWICE A DAY      Omeprazole 40 MG Oral Capsule Delayed Release Take 1 capsule (40 mg total) by mouth daily. Take 1 capsule by mouth daily before breakfast. 60 capsule 6      Counseling given: Not Answered       REVIEW OF SYSTEMS:   CONSTITUTIONAL:  See HPI.  HEENT:  Eyes:  Denies eye pain, visual loss, blurred vision. Denies hearing loss, sneezing,  congestion, runny nose or sore throat.  INTEGUMENTARY:  Denies rashes, itching, skin lesion.  CARDIOVASCULAR:  Denies palpitations, edema, dyspnea on exertion or at rest. Chest wall pain as above.  RESPIRATORY:  Denies shortness of breath, wheezing, cough or sputum.  GASTROINTESTINAL:  See HPI. No blood in stools.  MUSCULOSKELETAL:  Has had some joint aches in shoulders.  NEUROLOGICAL:  Denies headache, seizures. Room spinning as above.    EXAM:   /78 (BP Location: Left arm, Patient Position: Sitting, Cuff Size: large)   Pulse 64   Temp 97.1 °F (36.2 °C) (Temporal)   Ht 5' 7\" (1.702 m)   Wt 210 lb (95.3 kg)   LMP 02/01/2024 (Exact Date)   SpO2 97%   BMI 32.89 kg/m²  Estimated body mass index is 32.89 kg/m² as calculated from the following:    Height as of this encounter: 5' 7\" (1.702 m).    Weight as of this encounter: 210 lb (95.3 kg).   Vital signs reviewed.Appears stated age, well groomed, in no acute distress.  Physical Exam:  GEN:  Patient is alert, awake and oriented, well developed, well nourished.  HEENT:  Head:  Normocephalic, atraumatic Eyes: EOMI, PERRLA, conjunctivae clear bilaterally, no eye discharge Ears: External normal, TM clear but retracted bilaterally. Nose: patent, no nasal discharge. Throat:  No tonsillar erythema or exudate.  Mouth:  No oral lesions, good dentition.  NECK: Supple, no CLAD, no thyromegaly.  SKIN: No rashes, no skin lesion, no bruising, good turgor.  HEART:  Regular rate and rhythm, no murmurs, rubs or gallops.  LUNGS: Clear to auscultation bilterally, no rales/rhonchi/wheezing.  CHEST: Chest wall tender to palpation in midsternal and lower left chest wall area.  ABDOMEN:  Soft, nondistended, nontender, bowel sounds normal in all 4 quadrants, no masses, no hepatosplenomegaly.  EXTREMITIES:  No edema.  NEURO:  No deficit, normal gait, strength and tone, sensory intact.    ASSESSMENT AND PLAN:   1. Bloating  -Consider constipation as a cause, will check KUB below.  Increase fiber in diet, fluids, exercise. Can use fiber supplement OTC, consider Miralax PRN.  - XR ABDOMEN (1 VIEW) (CPT=74018); Future    2. Vertigo  -Recommended eustachian tube exercises as demonstrated and printed for patient.  -Flonase BID, Claritin daily.  -Vertigo exercises printed for patient.  -May use Meclizine TID OTC PRN, advised this causes sedation and cannot be used before driving or with ETOH/other sedating medicines.  -See me 4 weeks, sooner PRN.    3. Chest wall pain  -Likely musculoskeletal. Will check CXR to r/o other cause.  -Medrol DosePak as below, discussed how to take and possible side effects.  -See me 4 weeks, sooner PRN. To ER with persistent/severe pain.  - XR CHEST PA + LAT CHEST (CPT=71046); Future  - methylPREDNISolone (MEDROL) 4 MG Oral Tablet Therapy Pack; As directed.  Dispense: 1 each; Refill: 0    4. History of viral illness  -See above.  - XR CHEST PA + LAT CHEST (CPT=71046); Future  - methylPREDNISolone (MEDROL) 4 MG Oral Tablet Therapy Pack; As directed.  Dispense: 1 each; Refill: 0    5. Slow transit constipation  -See above.  - XR ABDOMEN (1 VIEW) (CPT=74018); Future    6. Positive HUGO (antinuclear antibody)  -Follow-up with rheumatology.    7. Missed menses  -Negative pregnancy test.  - POC Urine pregnancy test [78244]      Meds & Refills for this Visit:  Requested Prescriptions     Signed Prescriptions Disp Refills    methylPREDNISolone (MEDROL) 4 MG Oral Tablet Therapy Pack 1 each 0     Sig: As directed.         Problem List:  Patient Active Problem List   Diagnosis    Recurrent UTI    Vitamin D deficiency       Estrellita Schneider, APRN  3/18/2024  11:52 AM

## 2024-03-26 ENCOUNTER — HOSPITAL ENCOUNTER (OUTPATIENT)
Age: 44
Discharge: HOME | End: 2024-03-26
Payer: COMMERCIAL

## 2024-03-26 VITALS
SYSTOLIC BLOOD PRESSURE: 132 MMHG | OXYGEN SATURATION: 97 % | DIASTOLIC BLOOD PRESSURE: 98 MMHG | HEART RATE: 87 BPM | HEIGHT: 67 IN | WEIGHT: 210 LBS | BODY MASS INDEX: 32.96 KG/M2 | TEMPERATURE: 99 F | RESPIRATION RATE: 16 BRPM

## 2024-03-26 DIAGNOSIS — J10.1 INFLUENZA B: ICD-10-CM

## 2024-03-26 DIAGNOSIS — R52 BODY ACHES: Primary | ICD-10-CM

## 2024-03-26 LAB
POCT INFLUENZA A: NEGATIVE
POCT INFLUENZA B: POSITIVE

## 2024-03-26 PROCEDURE — 99213 OFFICE O/P EST LOW 20 MIN: CPT | Performed by: PHYSICIAN ASSISTANT

## 2024-03-26 PROCEDURE — S0119 ONDANSETRON 4 MG: HCPCS | Performed by: PHYSICIAN ASSISTANT

## 2024-03-26 PROCEDURE — 87502 INFLUENZA DNA AMP PROBE: CPT | Performed by: PHYSICIAN ASSISTANT

## 2024-03-26 RX ORDER — OSELTAMIVIR PHOSPHATE 75 MG/1
75 CAPSULE ORAL 2 TIMES DAILY
Qty: 10 CAPSULE | Refills: 0 | Status: SHIPPED | OUTPATIENT
Start: 2024-03-26 | End: 2024-03-31

## 2024-03-26 RX ORDER — ONDANSETRON 4 MG/1
4 TABLET, ORALLY DISINTEGRATING ORAL ONCE
Status: COMPLETED | OUTPATIENT
Start: 2024-03-26 | End: 2024-03-26

## 2024-03-26 RX ORDER — ONDANSETRON 4 MG/1
4 TABLET, ORALLY DISINTEGRATING ORAL 2 TIMES DAILY PRN
Qty: 6 TABLET | Refills: 0 | Status: SHIPPED | OUTPATIENT
Start: 2024-03-26 | End: 2024-03-29

## 2024-03-26 NOTE — ED PROVIDER NOTES
Patient Seen in: [facility]      History     Chief Complaint   Patient presents with    Cough/URI     Stated Complaint: FEVER, BODY ACHES , HEADACHE, DIARRHEA    Subjective:   HPI    Patient is a otherwise healthy 43-year-old female who presents to immediate care due to cough x 1 day.  Associated symptoms include myalgias, nausea, diarrhea, sore throat, rhinorrhea.  Positive sick contacts at home sick with influenza.  Has been taking over-the-counter cold medication with mild relief.  Denying chest pain shortness of breath wheezing.    Objective:   History reviewed. No pertinent past medical history.           Past Surgical History:   Procedure Laterality Date           DELIVERY ONLY      CHOLECYSTECTOMY      COLONOSCOPY N/A 2023    Procedure: COLONOSCOPY /ESOPHAGOGASTRODUODENOSCOPY with biopsy;  Surgeon: Carmen Kapadia MD;  Location: [facility] ENDOSCOPY    LAPAROSCOPIC CHOLECYSTECTOMY N/A 2019                Social History     Socioeconomic History    Marital status:    Tobacco Use    Smoking status: Never    Smokeless tobacco: Never   Vaping Use    Vaping Use: Never used   Substance and Sexual Activity    Alcohol use: Yes     Comment: socially    Drug use: No              Review of Systems    Positive for stated complaint: FEVER, BODY ACHES , HEADACHE, DIARRHEA  Other systems are as noted in HPI.  Constitutional and vital signs reviewed.      All other systems reviewed and negative except as noted above.    Physical Exam     ED Triage Vitals [24 1726]   BP (!) 132/98   Pulse 87   Resp 16   Temp 98.8 °F (37.1 °C)   Temp src Temporal   SpO2 97 %   O2 Device None (Room air)       Current:BP (!) 132/98   Pulse 87   Temp 98.8 °F (37.1 °C) (Temporal)   Resp 16   Ht 170.2 cm (5' 7\")   Wt 95.3 kg   LMP 2024 (Exact Date)   SpO2 97%   BMI 32.89 kg/m²         Physical Exam    Vital signs reviewed. Nursing note reviewed.  Constitutional: Well-developed.  Well-nourished. In no acute distress  HENT: Mucous membranes moist. TMs intact bilaterally. No trismus. Uvula midline. Mild posterior pharynx erythema.  No petechiae, exudates, or posterior pharynx edema.  EYES: No scleral icterus or conjunctival injection.  NECK: Full ROM. Supple.   CARDIAC: Normal rate. Normal S1/ S2. 2+ distal pulses. No edema  PULM/CHEST: Clear to auscultation bilaterally. No wheezes  Extremities: Full ROM  NEURO: Awake, alert, following commands, moving extremities, answering questions.   SKIN: Warm and dry. No rash or lesions.  PSYCH: Normal judgment. Normal affect.        ED Course     Labs Reviewed   POCT FLU TEST - Abnormal; Notable for the following components:       Result Value    POCT INFLUENZA B Positive (*)     All other components within normal limits    Narrative:     This assay is a rapid molecular in vitro test utilizing nucleic acid amplification of influenza A and B viral RNA.                      MDM      Patient is a healthy 43-year-old female who presents to immediate care due to cough and congestion x 1 day.  Patient arrives with stable vitals.  Physical exam unremarkable with lungs clear to auscultation.  Most likely influenza as patient had rapid positive test today in immediate care.  Less likely COVID-19, pneumonia.  Patient states that children sick at home with influenza currently taking Tamiflu.  Discussed with patient risks versus benefits of taking Tamiflu, will prescribe at this time.  Will additionally give dose of Zofran prior to discharge for nausea, will prescribe Zofran at home.  Return protocols including new or worsening symptoms.  History given by patient.  Patient agreeable to plan all questions answered.  Work note given                                   Medical Decision Making      Disposition and Plan     Clinical Impression:  1. Body aches    2. Influenza B         Disposition:  Discharge  3/26/2024  6:09 pm    Follow-up:  Lory Mercado MD  [address]  [facility] [address]  [phone]    Call             Medications Prescribed:  Discharge Medication List as of 3/26/2024  6:18 PM        START taking these medications    Details   ondansetron 4 MG Oral Tablet Dispersible Take 1 tablet (4 mg total) by mouth 2 (two) times daily as needed., Normal, Disp-6 tablet, R-0      oseltamivir (TAMIFLU) 75 MG Oral Cap Take 1 capsule (75 mg total) by mouth 2 (two) times daily for 5 days., Normal, Disp-10 capsule, R-0

## 2024-03-26 NOTE — ED INITIAL ASSESSMENT (HPI)
Pt presents to the IC with c/o body aches, chills, non productive cough, headache and fatigue. +exposure to flu B. Pt has been using tylenol cold and flu. +diarrhea.

## 2024-04-19 ENCOUNTER — PATIENT MESSAGE (OUTPATIENT)
Dept: FAMILY MEDICINE CLINIC | Facility: CLINIC | Age: 44
End: 2024-04-19

## 2024-04-22 NOTE — TELEPHONE ENCOUNTER
From: Bianca Bardales  To: Estrellita Schneider  Sent: 4/19/2024 4:07 PM CDT  Subject: Referral to endocrinologist     Good afternoon,  I was wondering if I could please get a referral to see Dr. Baig.      Thank you  Bianca

## 2024-04-22 NOTE — TELEPHONE ENCOUNTER
She can see me to discuss. Otherwise it does not appear that she has an HMO and can see endocrinology on her own if she wants.

## 2024-04-23 ENCOUNTER — OFFICE VISIT (OUTPATIENT)
Dept: RHEUMATOLOGY | Facility: CLINIC | Age: 44
End: 2024-04-23
Payer: COMMERCIAL

## 2024-04-23 VITALS
SYSTOLIC BLOOD PRESSURE: 125 MMHG | DIASTOLIC BLOOD PRESSURE: 78 MMHG | WEIGHT: 213 LBS | HEART RATE: 70 BPM | HEIGHT: 67 IN | BODY MASS INDEX: 33.43 KG/M2

## 2024-04-23 DIAGNOSIS — R53.83 OTHER FATIGUE: ICD-10-CM

## 2024-04-23 DIAGNOSIS — R76.8 POSITIVE ANA (ANTINUCLEAR ANTIBODY): Primary | ICD-10-CM

## 2024-04-23 PROCEDURE — 99204 OFFICE O/P NEW MOD 45 MIN: CPT | Performed by: INTERNAL MEDICINE

## 2024-04-23 NOTE — PATIENT INSTRUCTIONS
You were seen today for positive HUGO, fatigue, recurrent infections, history of possible RA  Lets get some further blood work to see if anything autoimmune is going on  Depending on results I will let you know when to follow-up

## 2024-04-23 NOTE — PROGRESS NOTES
Bianca Bardales is a 43 year old female who presents for   Chief Complaint   Patient presents with    Consult     NP referred by VENKATA Schneider for Positive HUGO lab results    .   HPI:   CC: +HUGO  Consult: referred by VANESSA Schneider     This is a 44 yo F with hx of GERD, Anxiety referred for +HUGO.  She reports significant fatigue and exhaustion for many years.  She can sleep 10 to 11 hours at night but still not feel refreshed in the morning.  She also reports a lot of bloating in her abdomen, face all over her body.  She feels like she is retaining water.  Blood work was done showing a positive HUGO 1: 160.  She also has a history of possible RA.  After her first pregnancy in 2008 she had a lot of joint pain and swelling.  She was seen by rheumatology there was diagnosed with RA.  She was put on prednisone, methotrexate and Humira, she is on it for about a year.  It did not help any of her joint pain.  She was then tested for Lyme and was placed on antibiotics for 6 months and her pain went away.  During her second pregnancy in 2015 she had similar symptoms with a lot of joint pain and swelling.  She was again put on antibiotics for 6 months for Lyme and her symptoms went away.  She is not having intermittent joint pain since then.  About 2 years ago she noticed swelling in her knees when she was walking on the treadmill.  It would be very significant.  She also had pain and swelling in her right hand, it was hard to close.  All the symptoms have resolved.  She has not had any recent joint pain or swelling.  Denies any joint pain or swelling, rashes, oral ulcers, alopecia, serositis, DVT or PE, RP.  She does report dry eyes.  Reports some lymphadenopathy about her neck.  She recently had chest pain on the left side and was given a Medrol Dosepak, it helped her fatigue significantly.  She also has had recurrent infections.  She was sick in December, had UTI in January and February and then the flu last  month.    Past medications:  Prednisone, Methotrexate, Humira- back in  for about a year   Blood work:  +HUGO 1:160 nuclear, homogenous      Wt Readings from Last 2 Encounters:   24 213 lb (96.6 kg)   24 210 lb (95.3 kg)     Body mass index is 33.36 kg/m².      Current Outpatient Medications   Medication Sig Dispense Refill    Omeprazole 40 MG Oral Capsule Delayed Release Take 1 capsule (40 mg total) by mouth daily. Take 1 capsule by mouth daily before breakfast. 60 capsule 6    methylPREDNISolone (MEDROL) 4 MG Oral Tablet Therapy Pack As directed. (Patient not taking: Reported on 2024) 1 each 0    nitrofurantoin macrocrystal 50 MG Oral Cap Take 1 capsule (50 mg total) by mouth daily as needed. Take after intercourse (Patient not taking: Reported on 2024) 30 capsule 0    ergocalciferol 1.25 MG (50922 UT) Oral Cap Take 1 capsule (50,000 Units total) by mouth once a week. (Patient not taking: Reported on 2024)      Ferrous Sulfate 324 MG Oral Tab EC ferrous sulfate 324 mg (65 mg iron) tablet,delayed release   TAKE 1 TABLET BY MOUTH TWICE A DAY (Patient not taking: Reported on 2024)        No past medical history on file.   Past Surgical History:   Procedure Laterality Date           delivery only      Cholecystectomy      Colonoscopy N/A 2023    Procedure: COLONOSCOPY /ESOPHAGOGASTRODUODENOSCOPY with biopsy;  Surgeon: Carmen Kapadia MD;  Location: [facility] ENDOSCOPY    Laparoscopic cholecystectomy N/A 2019      Family History   Problem Relation Age of Onset    Endometrial Cancer Sister     Hypertension Brother     Hyperlipidemia Brother     Cancer Cousin     Breast Cancer Cousin 40    Cancer Other 70        colon cancer      Social History:  Social History     Socioeconomic History    Marital status:    Tobacco Use    Smoking status: Never    Smokeless tobacco: Never   Vaping Use    Vaping status: Never Used   Substance and Sexual Activity    Alcohol  use: Yes     Comment: socially    Drug use: No           REVIEW OF SYSTEMS:   Review Of Systems:  Constitutional: No fever, +bloating  Derm: No rashes, no oral ulcers, no alopecia, no photosensitivity, no psoriasis  HEENT: + dry eyes, no dry mouth, no Raynaud's, no nasal ulcers, no parotid swelling, no neck pain, no jaw pain, no temple pain  Eyes: No visual changes,   CVS: No chest pain, no heart disease  RS: No SOB, no Cough, No Pleurtic pain,   GI: No nausea, no vomiiting, no abominal pain, no hx of ulcer, no gastritis, no heartburn, no dyshpagia, no BRBPR or melena  : no dysuria, no hx of miscarriages, no DVT Hx, no hx of OCP,   Neuro: No numbness or tingling, no headache, no hx of seizures,   Psych: no hx of anxiety or depression  ENDO: no hx of thyroid disease, no hx of DM  Joint/Muscluskeltal: see HPI,   All other ROS are negative.     EXAM:   /78 (BP Location: Left arm, Patient Position: Sitting, Cuff Size: large)   Pulse 70   Ht 5' 7\" (1.702 m)   Wt 213 lb (96.6 kg)   LMP 02/01/2024 (Exact Date)   BMI 33.36 kg/m²   GEN: AAOx3, NAD  HEENT: EOMI, PERRLA, no injection or icterus, oral mucosa moist, no oral lesions. No lymphadenopathy. No facial rash  CVS: RRR, no murmurs rubs or gallops. Equal 2+ distal pulses.   LUNGS: CTAB, no increased work of breathing  ABDOMEN:  soft NT/ND, +BS, no HSM  SKIN: No rashes or skin lesions. No nail findings  MSK:  No swelling or synovitis on exam, +FROM in all joints  NEURO: Cranial nerves II-XII intact grossly. 5/5 strength throughout in both upper and lower extremities, sensation intact.  PSYCH: normal mood    LABS:     Component      Latest Ref Rng 2/22/2024   HUGO SCREEN, IFA      NEGATIVE  POSITIVE !    HUGO TITER      titer 1:160 (H)    HUGO TITER      titer 1:80 (H)    HUGO PATTERN Nuclear, Homogeneous !    HUGO PATTERN Nuclear, Speckled !         IMAGING:     CXR 3/2024:  Normal    ASSESSMENT AND PLAN:     Positive HUGO  - She was found to have a positive HUGO 1:  160 nuclear, speckled, homogenous pattern.  She reports a lot of fatigue, bloating.  Plan to order further blood work but suspicion is low for connective tissue disease  - CBC and CMP last year were normal    History of possible RA  - After her first pregnancy in 2008 she had a lot of joint pain and swelling.  She was put on prednisone, the Trexan Humira for a year but did not work.  She was eventually tapered off of it and has not been on any medication since then    History of Lyme  - She was treated for Lyme twice after her pregnancy in 2008 in [place] [facility].  She had a lot of joint pain after her pregnancies and bloating, she was placed on antibiotics for 6-month and the symptoms resolved    Thank you for allowing me to participate in this patients care. Pt will be of results and if follow-up is needed    Rosa Odonnell MD  4/23/2024  9:08 AM

## 2024-04-24 LAB
C-REACTIVE PROTEIN: 9.1 MG/L
CYCLIC CITRULLINATED$PEPTIDE (CCP) AB (IGG): 154 UNITS
LYME AB SCREEN: <0.9 INDEX
RHEUMATOID FACTOR: 179 IU/ML
SED RATE BY MODIFIED$WESTERGREN: 33 MM/H

## 2024-04-26 ENCOUNTER — TELEPHONE (OUTPATIENT)
Dept: RHEUMATOLOGY | Facility: CLINIC | Age: 44
End: 2024-04-26

## 2024-04-26 NOTE — TELEPHONE ENCOUNTER
If you can let patient know that I got her blood work but [facility] did not do the RNP or double-stranded DNA.  She has to go back and get those 2 blood works done    Blood work they came back as showing the positive blood work for rheumatoid arthritis.  Lyme is negative.  I do need those other 2 blood work

## 2024-04-29 NOTE — TELEPHONE ENCOUNTER
Phoned pt. Informed her not all lab work drawn. Identified she will go to [facility] in [place]. Orders faxed to [phone].

## 2024-04-30 ENCOUNTER — TELEPHONE (OUTPATIENT)
Dept: RHEUMATOLOGY | Facility: CLINIC | Age: 44
End: 2024-04-30

## 2024-04-30 NOTE — TELEPHONE ENCOUNTER
Andria, states that the patient is there at the lab and has a question regarding the DSDNA Antibody by SULMA.

## 2024-04-30 NOTE — TELEPHONE ENCOUNTER
Spoke with Andria regarding the DSDNA antibody lab test.They needed to verify code.  No further questions.

## 2024-05-02 LAB
ANA SCREEN, IFA: POSITIVE
DNA (DS) ANTIBODY: <1 IU/ML

## 2024-05-08 ENCOUNTER — OFFICE VISIT (OUTPATIENT)
Dept: OBGYN CLINIC | Facility: CLINIC | Age: 44
End: 2024-05-08
Payer: COMMERCIAL

## 2024-05-08 VITALS
SYSTOLIC BLOOD PRESSURE: 139 MMHG | WEIGHT: 213.38 LBS | DIASTOLIC BLOOD PRESSURE: 88 MMHG | BODY MASS INDEX: 33.49 KG/M2 | HEIGHT: 67 IN

## 2024-05-08 DIAGNOSIS — N91.1 AMENORRHEA, SECONDARY: ICD-10-CM

## 2024-05-08 DIAGNOSIS — N94.10 DYSPAREUNIA IN FEMALE: ICD-10-CM

## 2024-05-08 DIAGNOSIS — Z01.419 WELL WOMAN EXAM WITH ROUTINE GYNECOLOGICAL EXAM: Primary | ICD-10-CM

## 2024-05-08 DIAGNOSIS — Z12.31 ENCOUNTER FOR SCREENING MAMMOGRAM FOR BREAST CANCER: ICD-10-CM

## 2024-05-08 PROCEDURE — 99213 OFFICE O/P EST LOW 20 MIN: CPT | Performed by: STUDENT IN AN ORGANIZED HEALTH CARE EDUCATION/TRAINING PROGRAM

## 2024-05-08 PROCEDURE — 99396 PREV VISIT EST AGE 40-64: CPT | Performed by: STUDENT IN AN ORGANIZED HEALTH CARE EDUCATION/TRAINING PROGRAM

## 2024-05-08 NOTE — PROGRESS NOTES
[facility]  Obstetrics and Gynecology  Annual  Lane George PA-C    Chief Complaint   Patient presents with    Annual       Biancajuan Bardales is a 43 year old female  presenting for her annual well woman exam.  Patient's last menstrual period was 2024 (exact date). Patient has not done any at home pregnancy tests. She had a negative hCG with her PCP in mid-march. Menses within the last year were light and regular. Before then, menses were heavy and prolonged. Patient noting insomnia and hot flashes. Also complaining painful sex, mostly with insertion. Has not used lubrication due to concerns of recurrent UTIs. No new sexual partners, declines STD screening. She currently following with rheumatologist for rheumatoid arthritis. Patient's last pap smear was 1 year ago with normal results. No abnormal vaginal discharge, odor, irritation, or itching. No pelvic pain. No breast pain or masses.      Pap:   Contraception: Condoms  Mammo:   Colonoscopy:     OBSTETRICS HISTORY:     OB History    Para Term  AB Living   3 2 2   1 2   SAB IAB Ectopic Multiple Live Births   1       2      # Outcome Date GA Lbr Moris/2nd Weight Sex Type Anes PTL Lv   3 SAB            2 Term      Caesarean   JC   1 Term      Caesarean   JC       GYNE HISTORY:     Use of Birth Control (if yes, specify type): Condoms (2024  2:07 PM)  Hx Prior Abnormal Pap: No (2024  2:07 PM)  Pap Date: 23 (2024  2:07 PM)  Pap Result Notes: Negative (2024  2:07 PM)      History   Sexual Activity    Sexual activity: Not on file            No data to display                  MEDICAL HISTORY:     Past Medical History:   Diagnosis Date    Abnormal uterine bleeding 2024    Amenorrhea 3/1/24    Last period 24    Dyspareunia       Past Surgical History:   Procedure Laterality Date           delivery only      Cholecystectomy      Colonoscopy N/A 2023    Procedure: COLONOSCOPY  /ESOPHAGOGASTRODUODENOSCOPY with biopsy;  Surgeon: Carmen Kapadia MD;  Location: [facility] ENDOSCOPY    D & c  03/12/2007    Laparoscopic cholecystectomy N/A 11/18/2019       SOCIAL HISTORY:     Tobacco Use: Low Risk  (5/8/2024)    Patient History     Smoking Tobacco Use: Never     Smokeless Tobacco Use: Never     Passive Exposure: Not on file       Depression Screening:   Depression Screening (PHQ-2/PHQ-9): Over the LAST 2 WEEKS   Little interest or pleasure in doing things (over the last two weeks)?: Nearly every day  Little interest or pleasure in doing things: Nearly every day    Feeling down, depressed, or hopeless (over the last two weeks)?: Not at all  Feeling down, depressed, or hopeless: Not at all    PHQ-2 SCORE: 3  PHQ-2 SCORE: 3          FAMILY HISTORY:     Family History   Problem Relation Age of Onset    Diabetes Mother     Hypertension Mother     Diabetes Father     Hypertension Father     Endometrial Cancer Sister     Hypertension Brother     Hyperlipidemia Brother     Cancer Cousin     Breast Cancer Cousin 40    Cancer Other 70        colon cancer       MEDICATIONS:       Current Outpatient Medications:     Omeprazole 40 MG Oral Capsule Delayed Release, Take 1 capsule (40 mg total) by mouth daily. Take 1 capsule by mouth daily before breakfast., Disp: 60 capsule, Rfl: 6    ALLERGIES:     No Known Allergies    REVIEW OF SYSTEMS:     Review of Systems   Constitutional:  Negative for chills, fever and unexpected weight change.   Respiratory: Negative.     Cardiovascular: Negative.    Gastrointestinal:  Negative for abdominal pain, constipation, diarrhea and nausea.   Genitourinary:  Positive for menstrual problem and pelvic pain. Negative for dyspareunia, dysuria, genital sores, hematuria, vaginal bleeding, vaginal discharge and vaginal pain.   Musculoskeletal: Negative.    Skin: Negative.    Neurological: Negative.    Hematological: Negative.    Psychiatric/Behavioral:  Positive for sleep disturbance.           PHYSICAL EXAM:     Vitals:    05/08/24 1403   BP: 139/88   Weight: 213 lb 6.4 oz (96.8 kg)   Height: 5' 7\" (1.702 m)       Body mass index is 33.42 kg/m².     Constitutional: well developed, well nourished  Psychiatric:  Oriented to time, place, person and situation. Appropriate mood and affect  Head/Face: normocephalic  Neck/Thyroid: thyroid symmetric, no thyromegaly, no nodules, no adenopathy  Lymphatic:no abnormal supraclavicular or axillary adenopathy is noted  Breast: normal without palpable masses, tenderness, asymmetry, nipple discharge, nipple retraction or skin changes  Abdomen:  soft, nontender, nondistended, no masses  Skin/Hair: no unusual rashes or bruises  Extremities: no edema, no cyanosis    Pelvic Exam:  External Genitalia: normal appearance, hair distribution, and no lesions  Urethral Meatus:  normal in size, location, without lesions and prolapse  Bladder:  No fullness, masses or tenderness  Vagina:  Normal appearance without lesions, no abnormal discharge  Cervix:  Normal without tenderness on motion  Uterus: normal in size, contour, position, mobility, without tenderness  Adnexa: normal without masses or tenderness  Perineum: normal  Anus: no hemorroids       ASSESSMENT:     Bianca was seen today for annual.    Diagnoses and all orders for this visit:    Well woman exam with routine gynecological exam    Encounter for screening mammogram for breast cancer  -     [facility] QUETA 2D+3D SCREENING BILAT (CPT=77067/54122); Future    Amenorrhea, secondary  -     FSH  -     LH (Luteinizing Hormone)  -     HCG, Beta Subunit (Quant Pregnancy Test)  -     Prolactin    Dyspareunia in female            PLAN:   Normal exam.    Recommend repeat pap smear with co-testing every 3 years for normal/ -HPV.  Recommend annual screening mammograms.   Recommend screening colonoscopy starting at 50 or earlier if significant family history or concerning symptoms.   Contraceptive Counseling as needed.   Maintain  healthy lifestyle with well-balance diet and daily exercise.  Return to clinic in one year or as needed.    Secondary amenorrhea: FSH, LH, hCG, if in menopause we will discuss treatment options. If not will wait a few more months, if its been more than 6 months without a period she should notify us and will discuss starting Provera and further work-up. Patient in agreement with plan.    Dyspareunia:  Encouraged the use of lubrication as needed during intercourse. If no improvement, we discussed starting pelvic floor therapy    SUMMARY:  Pap: Next cotest 3-5 years per ASCCP guidelines.  BCM:  Condoms  STD screening: No  Mammogram: ordered placed   updated    FOLLOW-UP     No follow-ups on file.    URIEL HAMEED PA-C  2:08 PM  5/8/2024    Note to patient and family:  The 21st Century Cures Act makes medical notes available to patients in the interest of transparency.  However, please be advised that this is a medical document.  It is intended as a peer to peer communication.  It is written in medical language and may contain abbreviations or verbiage that are technical and unfamiliar.  It may appear blunt or direct.  Medical documents are intended to carry relevant information, facts as evident, and the clinical opinion of the practitioner.

## 2024-05-10 LAB
FSH: 23.9 MIU/ML
HCG, TOTAL, QN: <5 MIU/ML
LH: 21.2 MIU/ML
PROLACTIN: 5.8 NG/ML

## 2024-05-16 ENCOUNTER — PATIENT MESSAGE (OUTPATIENT)
Dept: OBGYN CLINIC | Facility: CLINIC | Age: 44
End: 2024-05-16

## 2024-05-20 NOTE — TELEPHONE ENCOUNTER
----- Message from Amna TREVINO sent at 5/16/2024  4:05 PM CDT -----  Regarding: FW: Test Result Question  Contact: [phone]  Patient requesting lab results, not reviewed at this time.  ----- Message -----  From: Bianca Bardales  Sent: 5/16/2024   3:49 PM CDT  To: [facility] Clinical Staff  Subject: Test Result Question                             Hi,    I was wondering if we will be going over the blood results from last week?     Thank you

## 2024-06-07 ENCOUNTER — OFFICE VISIT (OUTPATIENT)
Dept: ENDOCRINOLOGY CLINIC | Facility: CLINIC | Age: 44
End: 2024-06-07
Payer: COMMERCIAL

## 2024-06-07 VITALS
DIASTOLIC BLOOD PRESSURE: 79 MMHG | BODY MASS INDEX: 34 KG/M2 | HEART RATE: 67 BPM | WEIGHT: 215 LBS | SYSTOLIC BLOOD PRESSURE: 126 MMHG

## 2024-06-07 DIAGNOSIS — E66.9 OBESITY (BMI 30-39.9): ICD-10-CM

## 2024-06-07 DIAGNOSIS — N91.4 SECONDARY OLIGOMENORRHEA: Primary | ICD-10-CM

## 2024-06-07 DIAGNOSIS — E88.819 INSULIN RESISTANCE: ICD-10-CM

## 2024-06-07 PROCEDURE — 99204 OFFICE O/P NEW MOD 45 MIN: CPT | Performed by: INTERNAL MEDICINE

## 2024-06-07 RX ORDER — METFORMIN HYDROCHLORIDE 500 MG/1
500 TABLET, EXTENDED RELEASE ORAL 2 TIMES DAILY WITH MEALS
Qty: 180 TABLET | Refills: 1 | Status: SHIPPED | OUTPATIENT
Start: 2024-06-07

## 2024-06-07 RX ORDER — PHENTERMINE HYDROCHLORIDE 15 MG/1
15 CAPSULE ORAL EVERY MORNING
Qty: 30 CAPSULE | Refills: 2 | Status: SHIPPED | OUTPATIENT
Start: 2024-06-07

## 2024-06-07 NOTE — PROGRESS NOTES
Name: Bianca Bardales  Date: 6/7/2024    Referring Physician: No ref. provider found    Chief Complaint   Patient presents with    Consult     Pt in to discuss hormonal issues. Pt has not had period since February. Pt was diagnosed with pre menopause last month.       HISTORY OF PRESENT ILLNESS   Bianca Bardales is a 43 year old female who presents for   Chief Complaint   Patient presents with    Consult     Pt in to discuss hormonal issues. Pt has not had period since February. Pt was diagnosed with pre menopause last month.     44 y/o F presents for initial evaluation of irregular cycles.  She notes increased myalgias over the past few months. In addition she is having trouble with weight loss.  +hot flashes.  +fatigue +bloating.     Prior to stopping cycles were regular with 3- days of bleeding.  Prior treatment with OCPs over 15 years ago.      LMP 2/2024     Sister PCOS, Endometrial cancer. Maternal family h/o thyroid disease.     Family h/o breast CA cousin, aunt.     REVIEW OF SYSTEMS  Eyes: no change in vision  Neurologic: no headache, generalized or focal weakness or numbness.  Head: normal  ENT: normal  Lungs: no shortness of breath, wheezing or MANE  Cardiovascular:  no chest pain or palpitations  Gastrointestinal:  no abdominal pain, bowel movement problems  Musculoskeletal: no muscle pain or arthralgia  /Gyne: no frequency or discomfort while urinating  Psychiatric:  no acute distress, anxiety  or depression  Skin: normal moisturized skin    Medications:     Current Outpatient Medications:     Omeprazole 40 MG Oral Capsule Delayed Release, Take 1 capsule (40 mg total) by mouth daily. Take 1 capsule by mouth daily before breakfast., Disp: 60 capsule, Rfl: 6     Allergies:   No Known Allergies    Social History:   Social History     Socioeconomic History    Marital status:    Tobacco Use    Smoking status: Never    Smokeless tobacco: Never   Vaping Use    Vaping status: Never Used   Substance and  Sexual Activity    Alcohol use: Not Currently     Alcohol/week: 5.0 standard drinks of alcohol     Types: 5 Shots of liquor per week     Comment: Last time i dranked was well over two years    Drug use: Never       Medical History:   Past Medical History:    Abnormal uterine bleeding    Amenorrhea    Last period 24    Dyspareunia       Surgical history:   Past Surgical History:   Procedure Laterality Date           delivery only      Cholecystectomy      Colonoscopy N/A 2023    Procedure: COLONOSCOPY /ESOPHAGOGASTRODUODENOSCOPY with biopsy;  Surgeon: Carmen Kapadia MD;  Location: [facility] ENDOSCOPY    D & c  2007    Laparoscopic cholecystectomy N/A 2019       PHYSICAL EXAMINATION:  /79   Pulse 67   Wt 215 lb (97.5 kg)   LMP 2024 (Exact Date)   BMI 33.67 kg/m²     General Appearance:  Alert, in no acute distress, well developed  Eyes: normal conjunctivae, sclera.  Ears/Nose/Mouth/Throat/Neck:  normal hearing, normal speech   Musculoskeletal:  normal muscle strength and tone  PV: normal pulses of carotids, pedals  Skin:  normal moisture and skin texture  Hair & Nails:  normal scalp hair     Neuro:  sensory grossly intact and motor grossly intact  Psychiatric:  oriented to time, self, and place  Nutritional:  no abnormal weight gain or loss    ASSESSMENT/PLAN:    Secondary Amenorrhea  - Significant symptoms likely associated with perimenopause  - Discussed she is likely candidate for hormone therapy  - Could consider low dose OCP given young age vs. Transdermal estrogen through gynecology  - Recheck FSH, LH, Estradiol, Testosterone to rule out PCOS  - Further management based on above results     2. Metabolic Syndrome  - Discussed diagnossis  - Discussed importance of weight loss  - Discussed low CHO diet  - Start Phentermine 15mg PO daily, verbalized understanding of risks and benefits  - Start Metformin ER 500mg PO BID, verbalized understanding of risks and  benefits  - May consider GLP-1 but concerned about side effects     RTC 6 months     6/7/2024  Noemi Baig MD

## 2024-06-13 LAB
ACTH, PLASMA: 11 PG/ML (ref 6–50)
CORTISOL, TOTAL: 7 MCG/DL
ESTRADIOL: 98 PG/ML
FREE TESTOSTERONE: 2.8 PG/ML (ref 0.1–6.4)
FSH: 9.4 MIU/ML
LH: 4.6 MIU/ML
TESTOSTERONE, TOTAL,$/MS/MS: 33 NG/DL (ref 2–45)

## 2024-06-22 ENCOUNTER — HOSPITAL ENCOUNTER (OUTPATIENT)
Dept: MAMMOGRAPHY | Facility: HOSPITAL | Age: 44
Discharge: HOME | End: 2024-06-22
Attending: STUDENT IN AN ORGANIZED HEALTH CARE EDUCATION/TRAINING PROGRAM

## 2024-06-22 DIAGNOSIS — Z12.31 ENCOUNTER FOR SCREENING MAMMOGRAM FOR BREAST CANCER: ICD-10-CM

## 2024-06-22 PROCEDURE — 77063 BREAST TOMOSYNTHESIS BI: CPT | Performed by: STUDENT IN AN ORGANIZED HEALTH CARE EDUCATION/TRAINING PROGRAM

## 2024-06-22 PROCEDURE — 77067 SCR MAMMO BI INCL CAD: CPT | Performed by: STUDENT IN AN ORGANIZED HEALTH CARE EDUCATION/TRAINING PROGRAM

## 2024-11-22 NOTE — TELEPHONE ENCOUNTER
Copied from CRM #[id number]. Topic: MW Messaging - MW Patient Request  >> Nov 22, 2024  8:40 AM Susie PERRY wrote:  Zach called requesting to send a general message to clinician.   Verified issue is NOT regarding a symptom(s) requiring routine or emergent triage. Verified another message template type and CRM does not apply.    Selected 'Wrap Up CRM' and created new Telephone Encounter after clicking 'Convert to Clinical Call'. Selected appropriate Reason for Call.  Sent Pt message template and routed as routine priority per Clinician KB page to appropriate clinician pool. Readback full message.    -- DO NOT REPLY / DO NOT REPLY ALL --  -- This inbox is not monitored. If this was sent to the wrong provider or department, reroute message to P ECO Reroute pool. --  -- Message is from Engagement Center Operations (ECO) --    General Patient Message: Patient is calling to inform DR that she did have her Eye appt and that she did not forget to drop off the visit notes.  She will bring the notes at her next appt in Jan 2025   Caller Information       Contact Date/Time Type Contact Phone/Fax    11/22/2024 08:38 AM CST Phone (Incoming) Cynjeanie [phone]            Alternative phone number: NA    Can a detailed message be left? No  Patient has been advised the message will be addressed within 2-3 business days.                 This encounter is now closed.     RN replied via [employer]

## 2024-12-18 ENCOUNTER — OFFICE VISIT (OUTPATIENT)
Dept: ENDOCRINOLOGY CLINIC | Facility: CLINIC | Age: 44
End: 2024-12-18
Payer: COMMERCIAL

## 2024-12-18 VITALS
BODY MASS INDEX: 32.18 KG/M2 | SYSTOLIC BLOOD PRESSURE: 133 MMHG | DIASTOLIC BLOOD PRESSURE: 85 MMHG | WEIGHT: 205 LBS | HEIGHT: 67 IN | HEART RATE: 69 BPM

## 2024-12-18 DIAGNOSIS — N91.4 SECONDARY OLIGOMENORRHEA: ICD-10-CM

## 2024-12-18 DIAGNOSIS — E66.9 OBESITY (BMI 30-39.9): ICD-10-CM

## 2024-12-18 DIAGNOSIS — E88.810 METABOLIC SYNDROME: Primary | ICD-10-CM

## 2024-12-18 PROCEDURE — 99213 OFFICE O/P EST LOW 20 MIN: CPT | Performed by: INTERNAL MEDICINE

## 2024-12-18 RX ORDER — PHENTERMINE HYDROCHLORIDE 30 MG/1
30 CAPSULE ORAL EVERY MORNING
Qty: 30 CAPSULE | Refills: 2 | Status: SHIPPED | OUTPATIENT
Start: 2024-12-18

## 2024-12-18 NOTE — PROGRESS NOTES
Name: Bianca Bardales  Date: 12/18/2024    Referring Physician: No ref. provider found    Chief Complaint   Patient presents with    Follow - Up       HISTORY OF PRESENT ILLNESS   Bianca Bardales is a 44 year old female who presents for   Chief Complaint   Patient presents with    Follow - Up     45 y/o F presents for follow up evaluation of irregular cycles.  She notes increased myalgias over the past few months. In addition she is having trouble with weight loss.  +hot flashes.  +fatigue +bloating.     Prior to stopping cycles were regular with 3- days of bleeding.  Prior treatment with OCPs over 15 years ago.      LMP 2/2024     Sister PCOS, Endometrial cancer. Maternal family h/o thyroid disease.     Family h/o breast CA cousin, aunt.     12/2024  Since initial visit she was started on Phentermine and Metformin therapy.   She was able to lose approximately 10lbs with medication.  She is now off phentermine and has been able to maintain weight loss.     She does note persistent fatigue. Normal menstrual cycles.     REVIEW OF SYSTEMS  Eyes: no change in vision  Neurologic: no headache, generalized or focal weakness or numbness.  Head: normal  ENT: normal  Lungs: no shortness of breath, wheezing or MANE  Cardiovascular:  no chest pain or palpitations  Gastrointestinal:  no abdominal pain, bowel movement problems  Musculoskeletal: no muscle pain or arthralgia  /Gyne: no frequency or discomfort while urinating  Psychiatric:  no acute distress, anxiety  or depression  Skin: normal moisturized skin    Medications:     Current Outpatient Medications:     metFORMIN  MG Oral Tablet 24 Hr, Take 1 tablet (500 mg total) by mouth 2 (two) times daily with meals., Disp: 180 tablet, Rfl: 1    Phentermine HCl 15 MG Oral Cap, Take 1 capsule (15 mg total) by mouth every morning., Disp: 30 capsule, Rfl: 2    Omeprazole 40 MG Oral Capsule Delayed Release, Take 1 capsule (40 mg total) by mouth daily. Take 1 capsule by mouth  daily before breakfast., Disp: 60 capsule, Rfl: 6     Allergies:   No Known Allergies    Social History:   Social History     Socioeconomic History    Marital status:    Tobacco Use    Smoking status: Never    Smokeless tobacco: Never   Vaping Use    Vaping status: Never Used   Substance and Sexual Activity    Alcohol use: Not Currently     Alcohol/week: 5.0 standard drinks of alcohol     Types: 5 Shots of liquor per week     Comment: Last time i dranked was well over two years    Drug use: Never       Medical History:   Past Medical History:    Abnormal uterine bleeding    Amenorrhea    Last period 24    Dyspareunia       Surgical history:   Past Surgical History:   Procedure Laterality Date           delivery only      Cholecystectomy      Colonoscopy N/A 2023    Procedure: COLONOSCOPY /ESOPHAGOGASTRODUODENOSCOPY with biopsy;  Surgeon: Carmen Kapadia MD;  Location: [facility] ENDOSCOPY    D & c  2007    Laparoscopic cholecystectomy N/A 2019       PHYSICAL EXAMINATION:  /85   Pulse 69   Ht 5' 7\" (1.702 m)   Wt 205 lb (93 kg)   LMP 2024 (Exact Date)   BMI 32.11 kg/m²     General Appearance:  Alert, in no acute distress, well developed  Eyes: normal conjunctivae, sclera.  Ears/Nose/Mouth/Throat/Neck:  normal hearing, normal speech   Musculoskeletal:  normal muscle strength and tone  PV: normal pulses of carotids, pedals  Skin:  normal moisture and skin texture  Hair & Nails:  normal scalp hair     Neuro:  sensory grossly intact and motor grossly intact  Psychiatric:  oriented to time, self, and place  Nutritional:  no abnormal weight gain or loss    ASSESSMENT/PLAN:    Secondary Amenorrhea  - Significant symptoms likely associated with perimenopause  - Cycles are improved  - No evidence of PCOS     2. Metabolic Syndrome  - Discussed diagnossis  - Discussed importance of weight loss  - Discussed low CHO diet  - Congratulated patient on weight loss   - ReStart  Phentermine 30mg PO daily, verbalized understanding of risks and benefits  - Continue Metformin ER 500mg PO BID, verbalized understanding of risks and benefits  - May consider GLP-1 but concerned about side effects     RTC 6 months     12/18/2024    Noemi Baig MD

## 2025-02-27 ENCOUNTER — TELEMEDICINE (OUTPATIENT)
Dept: FAMILY MEDICINE CLINIC | Facility: CLINIC | Age: 45
End: 2025-02-27
Payer: COMMERCIAL

## 2025-02-27 DIAGNOSIS — H57.89 EYE IRRITATION: Primary | ICD-10-CM

## 2025-02-27 DIAGNOSIS — H57.8A2 FOREIGN BODY SENSATION, LEFT EYE: ICD-10-CM

## 2025-02-27 DIAGNOSIS — H53.149 PHOTOPHOBIA: ICD-10-CM

## 2025-02-27 PROCEDURE — 98004 SYNCH AUDIO-VIDEO EST SF 10: CPT | Performed by: NURSE PRACTITIONER

## 2025-02-27 NOTE — PROGRESS NOTES
Due to the real risk of possible exposure to Coronavirus (CoV-2, COVID-19) in the office/medical building and recommendations for social distancing (key to mitigation/limiting the spread of the virus) a Virtual or Telemedicine visit over the phone was performed as below.     Patient has consented to the Virtual/Telephone Check-In service and expresses understanding and accepts financial responsibility for any deductible, co-insurance and/or co-pays associated with this service.    Telehealth outside of [facility]  Telehealth Verbal Consent   I conducted a telehealth visit with Bianca Bardales today, 02/27/25, which was completed using two-way, real-time interactive audio and video communication. This has been done in good ching to provide continuity of care in the best interest of the provider-patient relationship, due to the COVID -19 public health crisis/national emergency where restrictions of face-to-face office visits are ongoing. Every conscious effort was taken to allow for sufficient and adequate time to complete the visit.  The patient was made aware of the limitations of the telehealth visit, including treatment limitations as no physical exam could be performed.  The patient was advised to call 911 or to go to the ER in case there was an emergency.  The patient was also advised of the potential privacy & security concerns related to the telehealth platform.   The patient was made aware of where to find [facility]'s notice of privacy practices, telehealth consent form and other related consent forms and documents.  which are located on the [facility] website. The patient verbally agreed to telehealth consent form, related consents and the risks discussed.    Lastly, the patient confirmed that they were in [place].   Included in this visit, time may have been spent reviewing labs, medications, radiology tests and decision making. Appropriate medical decision-making and tests are ordered as detailed in the plan of care  above.  Coding/billing information is submitted for this visit based on complexity of care and/or time spent for the visit.    HPI:  Chief Complaint   Patient presents with    Follow - Up     Pink eye       Bianca Bardales is a 44 year old female who calls for complaints of:    Eye irritation, watering, photophobia, and foreign-body sensation of L eye that started yesterday. Symptoms are worse when she looks at a screen for a prolonged time. Sees a white \"spot\" over L inner iris that was not there before yesterday. Denies vision changes, headache, dizziness. No pain with eye movements. Denies trauma to eye. Notes she did rub her eye yesterday morning but it did not hurt until later that day. Sees an ophthalmologist regularly.    ROS:  Negative except as above.    Physical Exam:  GEN:  Patient is alert, awake and oriented, well developed, well nourished.  EYES: Difficult to assess on limited video visit. No visible conjunctival erythema L eye, no exudate. EOMI. Small white spot somewhat visible on inner aspect of L iris. Pupil unable to be assessed.  LUNGS: Patient speaks clearly in full sentences without dyspnea, no cough while on video visit.  PSYCH: Appropriate mood and affect.    Allergies[1]  Current Outpatient Medications   Medication Sig Dispense Refill    Phentermine HCl 30 MG Oral Cap Take 1 capsule (30 mg total) by mouth every morning. 30 capsule 2    metFORMIN  MG Oral Tablet 24 Hr Take 1 tablet (500 mg total) by mouth 2 (two) times daily with meals. 180 tablet 1    Omeprazole 40 MG Oral Capsule Delayed Release Take 1 capsule (40 mg total) by mouth daily. Take 1 capsule by mouth daily before breakfast. 60 capsule 6     Past Medical History:    Abnormal uterine bleeding    Amenorrhea    Last period 24    Dyspareunia     Past Surgical History:   Procedure Laterality Date           delivery only      Cholecystectomy      Colonoscopy N/A 2023    Procedure: COLONOSCOPY  /ESOPHAGOGASTRODUODENOSCOPY with biopsy;  Surgeon: Carmen Kapadia MD;  Location: [facility] ENDOSCOPY    D & c  03/12/2007    Laparoscopic cholecystectomy N/A 11/18/2019         ASSESSMENT AND PLAN:   1. Patient is a 44 year old female who calls for: Eye issue    Additional Assessment and Plan:  1. Eye irritation  -Recommended evaluation with her ophthalmologist today to r/o corneal ulcer or other acute concern. She will call them now and notify me if she is not able to be seen today.  -To ER with vision changes, severe headache.     2. Photophobia  -See above.    3. Foreign body sensation, left eye  -See above.        Follow up with me 1 week    Call and/or go to the ER if worsening symptoms including, but not limited to: respiratory distress, shortness of breath and  wheezing, worsening fever, cough and mental status.      The patient (or patient's parent if <17 y/o) indicates understanding of the above recommendations and agrees to the above plan.    No orders of the defined types were placed in this encounter.      Meds & Refills for this Visit:  Requested Prescriptions      No prescriptions requested or ordered in this encounter       Imaging & Consults:  None    VENKATA Morfin     Time spent during encounter: 10 minutes             [1] No Known Allergies

## 2025-03-15 ENCOUNTER — OFFICE VISIT (OUTPATIENT)
Dept: FAMILY MEDICINE CLINIC | Facility: CLINIC | Age: 45
End: 2025-03-15
Payer: COMMERCIAL

## 2025-03-15 VITALS
HEART RATE: 82 BPM | WEIGHT: 202 LBS | HEIGHT: 67 IN | BODY MASS INDEX: 31.71 KG/M2 | OXYGEN SATURATION: 97 % | SYSTOLIC BLOOD PRESSURE: 122 MMHG | DIASTOLIC BLOOD PRESSURE: 86 MMHG

## 2025-03-15 DIAGNOSIS — E88.810 METABOLIC SYNDROME: ICD-10-CM

## 2025-03-15 DIAGNOSIS — Z12.31 SCREENING MAMMOGRAM, ENCOUNTER FOR: ICD-10-CM

## 2025-03-15 DIAGNOSIS — E55.9 VITAMIN D DEFICIENCY: ICD-10-CM

## 2025-03-15 DIAGNOSIS — Z00.00 ADULT GENERAL MEDICAL EXAMINATION: Primary | ICD-10-CM

## 2025-03-15 PROCEDURE — 99396 PREV VISIT EST AGE 40-64: CPT | Performed by: NURSE PRACTITIONER

## 2025-03-15 NOTE — PROGRESS NOTES
Chief Complaint:   Chief Complaint   Patient presents with    Physical       HPI:   This is a 44 year old female coming in for physical. Feels well overall. Seeing endocrinology for metabolic syndrome and amenorrhea, started metformin and this improved her bloating and gave her regular periods. Periods stopped again in January. She is also on phentermine for weight, feels like this helps her energy level and focus but has not helped her lose much weight. Sees gynecology.    Results for orders placed or performed in visit on 24   FSH    Collection Time: 24 10:16 AM   Result Value Ref Range    FSH 9.4 mIU/mL   Estradiol    Collection Time: 24 10:16 AM   Result Value Ref Range    ESTRADIOL 98 pg/mL   LH (Luteinizing Hormone)    Collection Time: 24 10:16 AM   Result Value Ref Range    LH 4.6 mIU/mL   Testosterone,Free And Total    Collection Time: 24 10:16 AM   Result Value Ref Range    TESTOSTERONE, TOTAL,$LC/MS/MS 33 2 - 45 ng/dL    FREE TESTOSTERONE 2.8 0.1 - 6.4 pg/mL   Cortisol    Collection Time: 24 10:16 AM   Result Value Ref Range    CORTISOL, TOTAL 7.0 mcg/dL   ACTH, Plasma    Collection Time: 24 10:16 AM   Result Value Ref Range    ACTH, PLASMA 11 6 - 50 pg/mL             Past Medical History:    Abnormal uterine bleeding    Amenorrhea    Last period 24    Dyspareunia     Past Surgical History:   Procedure Laterality Date           delivery only      Cholecystectomy      Colonoscopy N/A 2023    Procedure: COLONOSCOPY /ESOPHAGOGASTRODUODENOSCOPY with biopsy;  Surgeon: Carmen Kapadia MD;  Location: [facility] ENDOSCOPY    D & c  2007    Laparoscopic cholecystectomy N/A 2019     Social History:  Social History     Socioeconomic History    Marital status:    Tobacco Use    Smoking status: Never    Smokeless tobacco: Never   Vaping Use    Vaping status: Never Used   Substance and Sexual Activity    Alcohol use: Not Currently      Alcohol/week: 5.0 standard drinks of alcohol     Types: 5 Shots of liquor per week     Comment: Last time i dranked was well over two years    Drug use: Never     Family History:  Family History   Problem Relation Age of Onset    Diabetes Mother     Hypertension Mother     Diabetes Father     Hypertension Father     Endometrial Cancer Sister     Hypertension Brother     Hyperlipidemia Brother     Prostate Cancer Maternal Uncle     Cancer Cousin     Breast Cancer Cousin 40    Cancer Other 70        colon cancer     Allergies:  Allergies[1]  Current Meds:  Current Outpatient Medications   Medication Sig Dispense Refill    Phentermine HCl 30 MG Oral Cap Take 1 capsule (30 mg total) by mouth every morning. 30 capsule 2    metFORMIN  MG Oral Tablet 24 Hr Take 1 tablet (500 mg total) by mouth 2 (two) times daily with meals. 180 tablet 1      Counseling given: Not Answered       REVIEW OF SYSTEMS:   CONSTITUTIONAL:  Denies unusual weight gain/loss, fever, chills, or fatigue.  HEENT:  Eyes:  Denies eye pain, visual loss, blurred vision. Denies hearing loss, sneezing, congestion, runny nose or sore throat.  INTEGUMENTARY:  Denies rashes, itching, skin lesion.  CARDIOVASCULAR:  Denies chest pain, palpitations, edema, dyspnea on exertion or at rest.  RESPIRATORY:  Denies shortness of breath, wheezing, cough or sputum.  GASTROINTESTINAL:  Denies abdominal pain, nausea, vomiting, constipation, diarrhea, or blood in stool.  GENITOURINARY: Denies dysuria, hematuria, frequency. LMP 1/2025.  MUSCULOSKELETAL:  Denies weakness, muscle aches, back pain, joint pain, swelling or stiffness.  NEUROLOGICAL:  Denies headache, seizures, dizziness.  PSYCHIATRIC:  Denies depression or anxiety. Denies suicidal thoughts.    EXAM:   /86 (BP Location: Right arm, Patient Position: Sitting, Cuff Size: adult)   Pulse 82   Ht 5' 7\" (1.702 m)   Wt 202 lb (91.6 kg)   LMP 01/06/2025 (Exact Date)   SpO2 97%   BMI 31.64 kg/m²  Estimated  body mass index is 31.64 kg/m² as calculated from the following:    Height as of this encounter: 5' 7\" (1.702 m).    Weight as of this encounter: 202 lb (91.6 kg).   Vital signs reviewed.Appears stated age, well groomed, in no acute distress.  Physical Exam:  GEN:  Patient is alert, awake and oriented, well developed, well nourished.  HEENT:  Head:  Normocephalic, atraumatic Eyes: EOMI, PERRLA, conjunctivae clear bilaterally, no eye discharge Ears: External normal, TM clear bilaterally. Nose: patent, no nasal discharge. Throat:  No tonsillar erythema or exudate.  Mouth:  No oral lesions, good dentition.  NECK: Supple, no CLAD, no thyromegaly.  SKIN: No rashes, no skin lesion, no bruising, good turgor.  HEART:  Regular rate and rhythm, no murmurs, rubs or gallops.  LUNGS: Clear to auscultation bilterally, no rales/rhonchi/wheezing.  BREASTS: Symmetrical, no palpable lump or axillary lymphadenopathy bilaterally, no nipple discharge/retraction or skin changes bilaterally.  ABDOMEN:  Soft, nondistended, nontender, bowel sounds normal in all 4 quadrants, no masses, no hepatosplenomegaly.  BACK: No tenderness to palpation, FROM.  EXTREMITIES:  No edema, no cyanosis, no clubbing, FROM, 2+ dorsalis pedis pulses bilaterally.  NEURO:  No deficit, normal gait, strength and tone, sensory intact, normal reflexes.    ASSESSMENT AND PLAN:   1. Adult general medical examination  -Healthy diet and regular exercise.  -Annual eye exam and biannual dental visits.  -Labs as below.  - CBC With Differential With Platelet  - Comp Metabolic Panel (14)  - Hemoglobin A1C  - Lipid Panel  - TSH and Free T4 [E]    2. Vitamin D deficiency  - VITAMIN D, SCREEN [80648][Q]    3. Metabolic syndrome  -Followed by endocrinology.  - INSULIN [561] [Q]    4. Screening mammogram, encounter for  -Due in June 2025.  - [facility] QUETA 2D+3D SCREENING BILAT (CPT=77067/22338); Future      Meds & Refills for this Visit:  Requested Prescriptions      No prescriptions  requested or ordered in this encounter         Problem List:  Patient Active Problem List   Diagnosis    Recurrent UTI    Vitamin D deficiency       Estrellita Schneider, APRN  3/15/2025  9:24 AM         [1] No Known Allergies

## 2025-03-17 LAB
ABSOLUTE BASOPHILS: 32 CELLS/UL (ref 0–200)
ABSOLUTE EOSINOPHILS: 288 CELLS/UL (ref 15–500)
ABSOLUTE LYMPHOCYTES: 2400 CELLS/UL (ref 850–3900)
ABSOLUTE MONOCYTES: 429 CELLS/UL (ref 200–950)
ABSOLUTE NEUTROPHILS: 3251 CELLS/UL (ref 1500–7800)
ALBUMIN/GLOBULIN RATIO: 1.3 (CALC) (ref 1–2.5)
ALBUMIN: 4 G/DL (ref 3.6–5.1)
ALKALINE PHOSPHATASE: 76 U/L (ref 31–125)
ALT: 25 U/L (ref 6–29)
AST: 20 U/L (ref 10–30)
BASOPHILS: 0.5 %
BILIRUBIN, TOTAL: 0.5 MG/DL (ref 0.2–1.2)
BUN: 10 MG/DL (ref 7–25)
CALCIUM: 9.3 MG/DL (ref 8.6–10.2)
CARBON DIOXIDE: 24 MMOL/L (ref 20–32)
CHLORIDE: 106 MMOL/L (ref 98–110)
CHOL/HDLC RATIO: 3 (CALC)
CHOLESTEROL, TOTAL: 174 MG/DL
CREATININE: 0.67 MG/DL (ref 0.5–0.99)
EGFR: 112 ML/MIN/1.73M2
EOSINOPHILS: 4.5 %
GLOBULIN: 3.2 G/DL (CALC) (ref 1.9–3.7)
GLUCOSE: 85 MG/DL (ref 65–99)
HDL CHOLESTEROL: 58 MG/DL
HEMATOCRIT: 39.2 % (ref 35–45)
HEMOGLOBIN A1C: 5.2 % OF TOTAL HGB
HEMOGLOBIN: 12.4 G/DL (ref 11.7–15.5)
INSULIN: 8.3 UIU/ML
LDL-CHOLESTEROL: 94 MG/DL (CALC)
LYMPHOCYTES: 37.5 %
MCH: 26.6 PG (ref 27–33)
MCHC: 31.6 G/DL (ref 32–36)
MCV: 83.9 FL (ref 80–100)
MONOCYTES: 6.7 %
MPV: 9.4 FL (ref 7.5–12.5)
NEUTROPHILS: 50.8 %
NON-HDL CHOLESTEROL: 116 MG/DL (CALC)
PLATELET COUNT: 303 THOUSAND/UL (ref 140–400)
POTASSIUM: 4 MMOL/L (ref 3.5–5.3)
PROTEIN, TOTAL: 7.2 G/DL (ref 6.1–8.1)
RDW: 13.9 % (ref 11–15)
RED BLOOD CELL COUNT: 4.67 MILLION/UL (ref 3.8–5.1)
SODIUM: 138 MMOL/L (ref 135–146)
T4, FREE: 1.6 NG/DL (ref 0.8–1.8)
TRIGLYCERIDES: 128 MG/DL
TSH: 1.81 MIU/L
VITAMIN D, 25-OH, TOTAL: 27 NG/ML (ref 30–100)
WHITE BLOOD CELL COUNT: 6.4 THOUSAND/UL (ref 3.8–10.8)

## 2025-06-18 ENCOUNTER — OFFICE VISIT (OUTPATIENT)
Dept: ENDOCRINOLOGY CLINIC | Facility: CLINIC | Age: 45
End: 2025-06-18

## 2025-06-18 VITALS
DIASTOLIC BLOOD PRESSURE: 84 MMHG | SYSTOLIC BLOOD PRESSURE: 128 MMHG | HEART RATE: 77 BPM | BODY MASS INDEX: 31 KG/M2 | WEIGHT: 198 LBS

## 2025-06-18 DIAGNOSIS — E88.810 METABOLIC SYNDROME: ICD-10-CM

## 2025-06-18 DIAGNOSIS — R63.5 WEIGHT GAIN: ICD-10-CM

## 2025-06-18 DIAGNOSIS — N91.4 SECONDARY OLIGOMENORRHEA: Primary | ICD-10-CM

## 2025-06-18 PROCEDURE — 99213 OFFICE O/P EST LOW 20 MIN: CPT | Performed by: INTERNAL MEDICINE

## 2025-06-18 RX ORDER — LISDEXAMFETAMINE DIMESYLATE 20 MG/1
20 CAPSULE ORAL DAILY
Qty: 30 CAPSULE | Refills: 0 | Status: SHIPPED | OUTPATIENT
Start: 2025-06-18 | End: 2025-07-18

## 2025-06-18 RX ORDER — LISDEXAMFETAMINE DIMESYLATE 20 MG/1
20 CAPSULE ORAL DAILY
Qty: 30 CAPSULE | Refills: 0 | Status: SHIPPED | OUTPATIENT
Start: 2025-07-19 | End: 2025-08-18

## 2025-06-18 RX ORDER — LISDEXAMFETAMINE DIMESYLATE 20 MG/1
20 CAPSULE ORAL DAILY
Qty: 30 CAPSULE | Refills: 0 | Status: SHIPPED | OUTPATIENT
Start: 2025-08-19 | End: 2025-09-18

## 2025-06-18 NOTE — PROGRESS NOTES
Name: Bianca Bardales  Date: 6/18/2025    Referring Physician: No ref. provider found    Chief Complaint   Patient presents with    Follow - Up       HISTORY OF PRESENT ILLNESS   Bianca Bardales is a 44 year old female who presents for   Chief Complaint   Patient presents with    Follow - Up     43 y/o F presents for follow up evaluation of irregular cycles.  She notes increased myalgias over the past few months. In addition she is having trouble with weight loss.  +hot flashes.  +fatigue +bloating.     Prior to stopping cycles were regular with 3- days of bleeding.  Prior treatment with OCPs over 15 years ago.      LMP 2/2024     Sister PCOS, Endometrial cancer. Maternal family h/o thyroid disease.     Family h/o breast CA cousin, aunt.     12/2024  Since initial visit she was started on Phentermine and Metformin therapy.   She was able to lose approximately 10lbs with medication.  She is now off phentermine and has been able to maintain weight loss.     She does note persistent fatigue. Normal menstrual cycles.     6/2025  She has been able to lose another 7lbs since last visit.  She lost total 17lbs in the past year.  She is maintained on Metformin therapy and tolerating well.  She just restarted phentermine therapy last week after being off for a month.     REVIEW OF SYSTEMS  Eyes: no change in vision  Neurologic: no headache, generalized or focal weakness or numbness.  Head: normal  ENT: normal  Lungs: no shortness of breath, wheezing or MANE  Cardiovascular:  no chest pain or palpitations  Gastrointestinal:  no abdominal pain, bowel movement problems  Musculoskeletal: no muscle pain or arthralgia  /Gyne: no frequency or discomfort while urinating  Psychiatric:  no acute distress, anxiety  or depression  Skin: normal moisturized skin    Medications:     Current Outpatient Medications:     Phentermine HCl 30 MG Oral Cap, Take 1 capsule (30 mg total) by mouth every morning., Disp: 30 capsule, Rfl: 2    metFORMIN   MG Oral Tablet 24 Hr, Take 1 tablet (500 mg total) by mouth 2 (two) times daily with meals., Disp: 180 tablet, Rfl: 1     Allergies:   No Known Allergies    Social History:   Social History     Socioeconomic History    Marital status:    Tobacco Use    Smoking status: Never    Smokeless tobacco: Never   Vaping Use    Vaping status: Never Used   Substance and Sexual Activity    Alcohol use: Not Currently     Alcohol/week: 5.0 standard drinks of alcohol     Types: 5 Shots of liquor per week     Comment: Last time i dranked was well over two years    Drug use: Never       Medical History:   Past Medical History:    Abnormal uterine bleeding    Amenorrhea    Last period 24    Dyspareunia       Surgical history:   Past Surgical History:   Procedure Laterality Date           delivery only      Cholecystectomy      Colonoscopy N/A 2023    Procedure: COLONOSCOPY /ESOPHAGOGASTRODUODENOSCOPY with biopsy;  Surgeon: Carmen Kapadia MD;  Location: [facility] ENDOSCOPY    D & c  2007    Laparoscopic cholecystectomy N/A 2019       PHYSICAL EXAMINATION:  /84   Pulse 77   Wt 198 lb (89.8 kg)   LMP 2025 (Exact Date)   BMI 31.01 kg/m²     General Appearance:  Alert, in no acute distress, well developed  Eyes: normal conjunctivae, sclera.  Ears/Nose/Mouth/Throat/Neck:  normal hearing, normal speech   Musculoskeletal:  normal muscle strength and tone  PV: normal pulses of carotids, pedals  Skin:  normal moisture and skin texture  Hair & Nails:  normal scalp hair     Neuro:  sensory grossly intact and motor grossly intact  Psychiatric:  oriented to time, self, and place  Nutritional:  no abnormal weight gain or loss    ASSESSMENT/PLAN:    Secondary Amenorrhea  - Significant symptoms likely associated with perimenopause  - Cycles are improved with weight loss   - No evidence of PCOS     2. Metabolic Syndrome  - Discussed diagnossis  - Discussed importance of weight loss  -  Discussed low CHO diet  - Congratulated patient on weight loss   - Discontinue Phentermine  - Change to Vyvanse 20mg PO daily, verbalized understanding of risks and benefits   - Continue Metformin ER 500mg PO BID, verbalized understanding of risks and benefits  - May consider GLP-1 but concerned about side effects     RTC 6 months     6/18/2025  Noemi Baig MD

## 2025-06-23 ENCOUNTER — PATIENT MESSAGE (OUTPATIENT)
Dept: ENDOCRINOLOGY CLINIC | Facility: CLINIC | Age: 45
End: 2025-06-23

## 2025-06-23 DIAGNOSIS — R63.5 WEIGHT GAIN: ICD-10-CM

## 2025-06-24 ENCOUNTER — HOSPITAL ENCOUNTER (OUTPATIENT)
Dept: MAMMOGRAPHY | Facility: HOSPITAL | Age: 45
Discharge: HOME | End: 2025-06-24
Attending: NURSE PRACTITIONER
Payer: COMMERCIAL

## 2025-06-24 DIAGNOSIS — Z12.31 SCREENING MAMMOGRAM, ENCOUNTER FOR: ICD-10-CM

## 2025-06-24 PROCEDURE — 77067 SCR MAMMO BI INCL CAD: CPT | Performed by: NURSE PRACTITIONER

## 2025-06-24 PROCEDURE — 77063 BREAST TOMOSYNTHESIS BI: CPT | Performed by: NURSE PRACTITIONER

## 2025-06-24 NOTE — TELEPHONE ENCOUNTER
Pt requesting medication to be sent to a different pharmacy due to cost. Pended below.       Endocrine Refill protocol for oral medications    Protocol Criteria:  PASSED  Reason: N/A    If below requirement is met, send a 90-day supply with 1 refill per provider protocol.    Verify appointment with Endocrinology completed in the last 6 months or scheduled in the next 3 months.    Last completed office visit:6/18/2025 Noemi Baig MD   Last completed telemed visit: Visit date not found  Next scheduled Follow up:   Future Appointments   Date Time Provider Department Center   6/24/2025 11:20 AM [facility] [place]   12/17/2025  9:45 AM Noemi Baig MD [facility]

## 2025-06-25 RX ORDER — LISDEXAMFETAMINE DIMESYLATE 20 MG/1
20 CAPSULE ORAL DAILY
Qty: 30 CAPSULE | Refills: 0 | Status: SHIPPED | OUTPATIENT
Start: 2025-06-25 | End: 2025-07-25

## (undated) DEVICE — Device: Brand: DUAL NARE NASAL CANNULAE FEMALE LUER CON 7FT O2 TUBE

## (undated) DEVICE — YANKAUER SUCTION INSTRUMENT NO CONTROL VENT, BULB TIP, CLEAR: Brand: YANKAUER

## (undated) DEVICE — LAP CHOLE: Brand: MEDLINE INDUSTRIES, INC.

## (undated) DEVICE — SOL  .9 1000ML BAG

## (undated) DEVICE — LIGAMAX 5 MM ENDOSCOPIC MULTIPLE CLIP APPLIER: Brand: LIGAMAX

## (undated) DEVICE — SUTURE VICRYL 0 UR-6

## (undated) DEVICE — SOL  .9 1000ML BTL

## (undated) DEVICE — UNDYED BRAIDED (POLYGLACTIN 910), SYNTHETIC ABSORBABLE SUTURE: Brand: COATED VICRYL

## (undated) DEVICE — TISSUE RETRIEVAL SYSTEM: Brand: INZII RETRIEVAL SYSTEM

## (undated) DEVICE — KIT CLEAN ENDOKIT 1.1OZ GOWNX2

## (undated) DEVICE — CONMED SCOPE SAVER BITE BLOCK, 20X27 MM: Brand: SCOPE SAVER

## (undated) DEVICE — TROCARS: Brand: KII® BALLOON BLUNT TIP SYSTEM

## (undated) DEVICE — MEDI-VAC NON-CONDUCTIVE SUCTION TUBING 6MM X 1.8M (6FT.) L: Brand: CARDINAL HEALTH

## (undated) DEVICE — [HIGH FLOW INSUFFLATOR,  DO NOT USE IF PACKAGE IS DAMAGED,  KEEP DRY,  KEEP AWAY FROM SUNLIGHT,  PROTECT FROM HEAT AND RADIOACTIVE SOURCES.]: Brand: PNEUMOSURE

## (undated) DEVICE — MEDI-VAC NON-CONDUCTIVE SUCTION TUBING: Brand: CARDINAL HEALTH

## (undated) DEVICE — DERMABOND LIQUID ADHESIVE

## (undated) DEVICE — ABDOMINAL BINDER: Brand: DEROYAL

## (undated) DEVICE — TROCAR: Brand: KII FIOS FIRST ENTRY

## (undated) DEVICE — TROCAR: Brand: KII® SLEEVE

## (undated) DEVICE — KIT ENDO ORCAPOD 160/180/190

## (undated) DEVICE — 60 ML SYRINGE REGULAR TIP: Brand: MONOJECT

## (undated) DEVICE — CAUTERY BLADE 2IN INS E1455

## (undated) DEVICE — GAMMEX® PI HYBRID SIZE 7.5, STERILE POWDER-FREE SURGICAL GLOVE, POLYISOPRENE AND NEOPRENE BLEND: Brand: GAMMEX

## (undated) DEVICE — FORCEP RADIAL JAW 4

## (undated) DEVICE — HEXAGONAL CAPTIVATOR STIFF 13M

## (undated) NOTE — ED AVS SNAPSHOT
Marolyn Duane   MRN: [id number]    Department:  [facility] Emergency Department   Date of Visit:  10/31/2019           Disclosure     Insurance plans vary and the physician(s) referred by the ER may not be covered by your plan.  Please contact CARE PHYSICIAN AT ONCE OR RETURN IMMEDIATELY TO THE EMERGENCY DEPARTMENT. If you have been prescribed any medication(s), please fill your prescription right away and begin taking the medication(s) as directed.   If you believe that any of the medications

## (undated) NOTE — LETTER
01/03/20        Patient: Missy Palma   YOB: 1980   Date of Visit: 1/3/2020       Dear  Dr. Figueroa Uribe MD,      Thank you for referring iMssy Palma to my practice. Please find my assessment and plan below.       Postoperative

## (undated) NOTE — LETTER
[address] [facility] [address] [place], [place]  Authorization for Surgical Operation and Procedure                                                                                           I hereby authorize Shanell Reyes MD, my physician and his/her assistants (if applicable), which may include medical students, residents, and/or fellows, to perform the following surgical operation/ procedure and administer such anesthesia as may be determined necessary by my physician: Operation/Procedure name (s) COLONOSCOPY /[facility]   2. I recognize that during the surgical operation/procedure, unforeseen conditions may necessitate additional or different procedures than those listed above. I, therefore, further authorize and request that the above-named surgeon, assistants, or designees perform such procedures as are, in their judgment, necessary and desirable. 3.   My surgeon/physician has discussed prior to my surgery the potential benefits, risks and side effects of this procedure; the likelihood of achieving goals; and potential problems that might occur during recuperation. They also discussed reasonable alternatives to the procedure, including risks, benefits, and side effects related to the alternatives and risks related to not receiving this procedure. I have had all my questions answered and I acknowledge that no guarantee has been made as to the result that may be obtained. 4.   Should the need arise during my operation/procedure, which includes change of level of care prior to discharge, I also consent to the administration of blood and/or blood products. Further, I understand that despite careful testing and screening of blood or blood products by collecting agencies, I may still be subject to ill effects as a result of receiving a blood transfusion and/or blood products.   The following are some, but not all, of the potential risks that can occur: fever and allergic reactions, hemolytic reactions, transmission of diseases such as Hepatitis, AIDS and Cytomegalovirus (CMV) and fluid overload. In the event that I wish to have an autologous transfusion of my own blood, or a directed donor transfusion, I will discuss this with my physician. Check only if Refusing Blood or Blood Products  I understand refusal of blood or blood products as deemed necessary by my physician may have serious consequences to my condition to include possible death. I hereby assume responsibility for my refusal and release the hospital, its personnel, and my physicians from any responsibility for the consequences of my refusal.    o  Refuse   5. I authorize the use of any specimen, organs, tissues, body parts or foreign objects that may be removed from my body during the operation/procedure for diagnosis, research or teaching purposes and their subsequent disposal by hospital authorities. I also authorize the release of specimen test results and/or written reports to my treating physician on the hospital medical staff or other referring or consulting physicians involved in my care, at the discretion of the Pathologist or my treating physician. 6.   I consent to the photographing or videotaping of the operations or procedures to be performed, including appropriate portions of my body for medical, scientific, or educational purposes, provided my identity is not revealed by the pictures or by descriptive texts accompanying them. If the procedure has been photographed/videotaped, the surgeon will obtain the original picture, image, videotape or CD. The hospital will not be responsible for storage, release or maintenance of the picture, image, tape or CD.    7.   I consent to the presence of a  or observers in the operating room as deemed necessary by my physician or their designees.     8.   I recognize that in the event my procedure results in extended X-Ray/fluoroscopy time, I may develop a skin reaction. 9. If I have a Do Not Attempt Resuscitation (DNAR) order in place, that status will be suspended while in the operating room, procedural suite, and during the recovery period unless otherwise explicitly stated by me (or a person authorized to consent on my behalf). The surgeon or my attending physician will determine when the applicable recovery period ends for purposes of reinstating the DNAR order. 10. Patients having a sterilization procedure: I understand that if the procedure is successful the results will be permanent and it will therefore be impossible for me to inseminate, conceive, or bear children. I also understand that the procedure is intended to result in sterility, although the result has not been guaranteed. 11. I acknowledge that my physician has explained sedation/analgesia administration to me including the risk and benefits I consent to the administration of sedation/analgesia as may be necessary or desirable in the judgment of my physician. I CERTIFY THAT I HAVE READ AND FULLY UNDERSTAND THE ABOVE CONSENT TO OPERATION and/or OTHER PROCEDURE.     _________________________________________ _________________________________     ___________________________________  Signature of Patient     Signature of Responsible Person                   Printed Name of Responsible Person                              _________________________________________ ______________________________        ___________________________________  Signature of Witness         Date  Time         Relationship to Patient    STATEMENT OF PHYSICIAN My signature below affirms that prior to the time of the procedure; I have explained to the patient and/or his/her legal representative, the risks and benefits involved in the proposed treatment and any reasonable alternative to the proposed treatment.  I have also explained the risks and benefits involved in refusal of the proposed treatment and alternatives to the proposed treatment and have answered the patient's questions.  If I have a significant financial interest in a co-management agreement or a significant financial interest in any product or implant, or other significant relationship used in this procedure/surgery, I have disclosed this and had a discussion with my patient.     _______________________________________________________________ _____________________________  Yazmin Barrow of Physician)                                                                                         (Date)                                   (Time)  Patient Name: Kaila Friend    : 1980   Printed: 2023      Medical Record #: [id number]                                              Page 1 of 1

## (undated) NOTE — LETTER
11/07/19        Patient: Oscar Maciel   YOB: 1980   Date of Visit: 11/7/2019       Dear  Dr. Melvin Wolfe MD,      Thank you for referring Candisniaruben Fallyuni to my practice. Please find my assessment and plan below.           ASSESSME